# Patient Record
Sex: FEMALE | Race: WHITE | Employment: FULL TIME | URBAN - METROPOLITAN AREA
[De-identification: names, ages, dates, MRNs, and addresses within clinical notes are randomized per-mention and may not be internally consistent; named-entity substitution may affect disease eponyms.]

---

## 2017-04-20 ENCOUNTER — OFFICE VISIT (OUTPATIENT)
Dept: CARDIOLOGY CLINIC | Age: 62
End: 2017-04-20

## 2017-04-20 VITALS
HEIGHT: 64 IN | SYSTOLIC BLOOD PRESSURE: 181 MMHG | DIASTOLIC BLOOD PRESSURE: 95 MMHG | HEART RATE: 69 BPM | BODY MASS INDEX: 32.95 KG/M2 | WEIGHT: 193 LBS

## 2017-04-20 DIAGNOSIS — I47.1 PAROXYSMAL SUPRAVENTRICULAR TACHYCARDIA (HCC): Primary | ICD-10-CM

## 2017-04-20 DIAGNOSIS — I10 ESSENTIAL HYPERTENSION: ICD-10-CM

## 2017-04-20 DIAGNOSIS — G47.33 OSA (OBSTRUCTIVE SLEEP APNEA): ICD-10-CM

## 2017-04-20 DIAGNOSIS — E78.5 HYPERLIPIDEMIA, UNSPECIFIED HYPERLIPIDEMIA TYPE: ICD-10-CM

## 2017-04-20 RX ORDER — METOPROLOL SUCCINATE 100 MG/1
100 TABLET, EXTENDED RELEASE ORAL DAILY
Qty: 30 TAB | Refills: 6 | Status: SHIPPED | OUTPATIENT
Start: 2017-04-20 | End: 2017-11-30 | Stop reason: SDUPTHER

## 2017-04-20 NOTE — MR AVS SNAPSHOT
Visit Information Date & Time Provider Department Dept. Phone Encounter #  
 4/20/2017 10:30 AM Karon Huggins MD [facility] [address] [place] [address] [id number] Follow-up Instructions Return in about 6 months (around 10/20/2017). Your Appointments 4/20/2017 10:30 AM  
ESTABLISHED PATIENT with Karon Huggins MD  
[facility]) Appt Note: 6 months/ i told pt to come in at 8:45; 6 months [address], [address] [facility] [address]  
[address], [address] [facility]  
  
    
 10/5/2017  8:45 AM  
ESTABLISHED PATIENT with Karon Huggins MD  
[facility]) Appt Note: 6 months [address], [address] [facility] [address] [phone] Upcoming Health Maintenance Date Due Hepatitis C Screening 1955 DTaP/Tdap/Td series (1 - Tdap) 5/2/1975 FOBT Q 1 YEAR AGE 50-75 5/2/2004 BREAST CANCER SCRN MAMMOGRAM 11/23/2011 ZOSTER VACCINE AGE 60> 5/2/2014 PAP AKA CERVICAL CYTOLOGY 1/19/2015 INFLUENZA AGE 9 TO ADULT 8/1/2016 Allergies as of 4/20/2017  Review Complete On: 4/20/2017 By: Karon Huggins MD  
  
 Severity Noted Reaction Type Reactions Pcn [Penicillins] Medium 09/08/2015    Hives Current Immunizations  Never Reviewed No immunizations on file. Not reviewed this visit You Were Diagnosed With   
  
 Codes Comments Paroxysmal supraventricular tachycardia (HCC)    -  Primary ICD-10-CM: I47.1 ICD-9-CM: 427.0 recent increase palpitation Essential hypertension     ICD-10-CM: I10 
ICD-9-CM: 401.9 elevated 
adjust meds VALERY (obstructive sleep apnea)     ICD-10-CM: G47.33 
ICD-9-CM: 327.23 not able to get cpap Hyperlipidemia, unspecified hyperlipidemia type     ICD-10-CM: E78.5 ICD-9-CM: 272.4 on statin 
lab with pcp Vitals BP Pulse Height(growth percentile) Weight(growth percentile) BMI OB Status (!) 181/95 69 5' 4\" (1.626 m) 193 lb (87.5 kg) 33.13 kg/m2 Postmenopausal  
 Smoking Status Former Smoker BMI and BSA Data Body Mass Index Body Surface Area  
 33.13 kg/m 2 1.99 m 2 Preferred Pharmacy Pharmacy Name Phone [facility] [phone] Your Updated Medication List  
  
   
This list is accurate as of: 4/20/17  9:10 AM.  Always use your most recent med list. amLODIPine 5 mg tablet Commonly known as:  Esther Rafter Take 1 Tab by mouth daily. aspirin delayed-release 81 mg tablet Take  by mouth daily. buPROPion  mg SR tablet Commonly known as:  Sophia Mychal Take 150 mg by mouth daily. melatonin 3 mg tablet Take 3 mg by mouth nightly. metoprolol succinate 100 mg tablet Commonly known as:  TOPROL-XL Take 1 Tab by mouth daily. omeprazole 40 mg capsule Commonly known as:  PRILOSEC Take 40 mg by mouth daily. simvastatin 20 mg tablet Commonly known as:  ZOCOR Take  by mouth nightly. Prescriptions Sent to Pharmacy Refills  
 metoprolol succinate (TOPROL-XL) 100 mg tablet 6 Sig: Take 1 Tab by mouth daily. Class: Normal  
 Pharmacy: [address] [place] [address] #: [phone] Route: Oral  
  
Follow-up Instructions Return in about 6 months (around 10/20/2017). Introducing [facility]! [employer] introduces [employer] patient portal. Now you can access parts of your medical record, email your doctor's office, and request medication refills online. 1. In your internet browser, go to [email or web address] 2. Click on the First Time User? Click Here link in the Sign In box. You will see the New Member Sign Up page. 3. Enter your Choose Digital Access Code exactly as it appears below. You will not need to use this code after youve completed the sign-up process. If you do not sign up before the expiration date, you must request a new code. · [employer] Access Code: [email or web address] Expires: 7/19/2017  8:32 AM 
 
4. Enter the last four digits of your Social Security Number (xxxx) and Date of Birth (mm/dd/yyyy) as indicated and click Submit. You will be taken to the next sign-up page. 5. Create a Choose Digital ID. This will be your Choose Digital login ID and cannot be changed, so think of one that is secure and easy to remember. 6. Create a [employer] password. You can change your password at any time. 7. Enter your Password Reset Question and Answer. This can be used at a later time if you forget your password. 8. Enter your e-mail address. You will receive e-mail notification when new information is available in [address]. 9. Click Sign Up. You can now view and download portions of your medical record. 10. Click the Download Summary menu link to download a portable copy of your medical information. If you have questions, please visit the Frequently Asked Questions section of the [employer] website. Remember, [employer] is NOT to be used for urgent needs. For medical emergencies, dial 911. Now available from your iPhone and Android! Please provide this summary of care documentation to your next provider. Your primary care clinician is listed as Darvin Bill. 18.. If you have any questions after today's visit, please call [phone].

## 2017-04-20 NOTE — LETTER
Peewee Betancur 1955 4/20/2017 Dear Javed Castillo MD 
 
I had the pleasure of evaluating  Ms. Ramesh in office today. Below are the relevant portions of my assessment and plan of care. ICD-10-CM ICD-9-CM 1. Paroxysmal supraventricular tachycardia (HCC) I47.1 427.0   
 recent increase palpitation 2. Essential hypertension I10 401.9   
 elevated 
adjust meds 3. VALERY (obstructive sleep apnea) G47.33 327.23   
 not able to get cpap 4. Hyperlipidemia, unspecified hyperlipidemia type E78.5 272.4   
 on statin 
lab with pcp Current Outpatient Prescriptions Medication Sig Dispense Refill  metoprolol succinate (TOPROL-XL) 100 mg tablet Take 1 Tab by mouth daily. 30 Tab 6  
 amLODIPine (NORVASC) 5 mg tablet Take 1 Tab by mouth daily. 30 Tab 5  
 buPROPion SR (WELLBUTRIN SR) 150 mg SR tablet Take 150 mg by mouth daily.  omeprazole (PRILOSEC) 40 mg capsule Take 40 mg by mouth daily.  simvastatin (ZOCOR) 20 mg tablet Take  by mouth nightly.  melatonin 3 mg tablet Take 3 mg by mouth nightly.  aspirin delayed-release 81 mg tablet Take  by mouth daily. Orders Placed This Encounter  metoprolol succinate (TOPROL-XL) 100 mg tablet Sig: Take 1 Tab by mouth daily. Dispense:  30 Tab Refill:  6 If you have questions, please do not hesitate to call me. I look forward to following Ms. Ramesh along with you. Sincerely, Brent Osullivan MD

## 2017-04-20 NOTE — PROGRESS NOTES
1. Have you been to the ER, urgent care clinic since your last visit? Hospitalized since your last visit?      no    2. Have you seen or consulted any other health care providers outside of the [address] [place] [address] since your last visit? Include any pap smears or colon screening. Yes, pcp    3. Since your last visit, have you had any of the following symptoms? No cardiac symptoms    4. Have you had any blood work, X-rays or cardiac testing? Yes, [facility]    5. Where do you normally have your labs drawn? [facility]    6. Do you need any refills today?   no

## 2017-04-20 NOTE — PROGRESS NOTES
HISTORY OF PRESENT ILLNESS  Jenny Leone is a 57 y.o. female. HPI Comments: Patient with svt,htn,ghazala  . On follow up patient denies any chest pains,sob,  or other significant symptoms. Hypertension   The history is provided by the patient. This is a chronic problem. The problem occurs constantly. The problem has not changed since onset. Pertinent negatives include no chest pain, no abdominal pain, no headaches and no shortness of breath. Palpitations    The history is provided by the patient. This is a recurrent problem. The problem has been gradually worsening. The problem occurs every several days. The problem is associated with nothing. Pertinent negatives include no fever, no chest pain, no claudication, no orthopnea, no PND, no abdominal pain, no nausea, no vomiting, no headaches, no dizziness, no weakness, no cough, no hemoptysis, no shortness of breath and no sputum production. Her past medical history is significant for hypertension. SVT   The history is provided by the patient. This is a new problem. The current episode started more than 2 days ago. The problem occurs every several days. The problem has been resolved. Pertinent negatives include no chest pain, no abdominal pain, no headaches and no shortness of breath. Nothing aggravates the symptoms. Nothing relieves the symptoms. Review of Systems   Constitutional: Negative for chills and fever. HENT: Negative for nosebleeds. Eyes: Negative for blurred vision and double vision. Respiratory: Negative for cough, hemoptysis, sputum production, shortness of breath and wheezing. Cardiovascular: Positive for palpitations. Negative for chest pain, orthopnea, claudication, leg swelling and PND. Gastrointestinal: Negative for abdominal pain, heartburn, nausea and vomiting. Musculoskeletal: Negative for myalgias. Skin: Negative for rash. Neurological: Negative for dizziness, weakness and headaches.    Endo/Heme/Allergies: Does not bruise/bleed easily. Family History   Problem Relation Age of Onset    Heart Attack Neg Hx     Heart Surgery Neg Hx     Hypertension Mother    [employer] [address] [employer] Pac[employer] Father     Hypertension Maternal Grandmother        Past Medical History:   Diagnosis Date    Essential hypertension 9/10/2015    Hyperlipidemia 4/20/2017       No past surgical history on file. Social History   Substance Use Topics    Smoking status: Former Smoker     Quit date: 9/10/2008    Smokeless tobacco: Never Used    Alcohol use No       Allergies   Allergen Reactions    Pcn [Penicillins] Hives       Outpatient Prescriptions Marked as Taking for the 4/20/17 encounter (Office Visit) with Audra Oconnell MD   Medication Sig Dispense Refill    metoprolol succinate (TOPROL-XL) 100 mg tablet Take 1 Tab by mouth daily. 30 Tab 6    amLODIPine (NORVASC) 5 mg tablet Take 1 Tab by mouth daily. 30 Tab 5    buPROPion SR (WELLBUTRIN SR) 150 mg SR tablet Take 150 mg by mouth daily.  omeprazole (PRILOSEC) 40 mg capsule Take 40 mg by mouth daily.  simvastatin (ZOCOR) 20 mg tablet Take  by mouth nightly.  melatonin 3 mg tablet Take 3 mg by mouth nightly. Visit Vitals    BP (!) 181/95    Pulse 69    Ht 5' 4\" (1.626 m)    Wt 87.5 kg (193 lb)    BMI 33.13 kg/m2         Physical Exam   Constitutional: She is oriented to person, place, and time. She appears well-developed and well-nourished. HENT:   Head: Normocephalic and atraumatic. Eyes: Conjunctivae are normal.   Neck: Neck supple. No JVD present. No tracheal deviation present. No thyromegaly present. Cardiovascular: Normal rate and regular rhythm. PMI is not displaced. Exam reveals no gallop, no S3 and no decreased pulses. No murmur heard. Pulmonary/Chest: No respiratory distress. She has no wheezes. She has no rales. She exhibits no tenderness. Abdominal: Soft. There is no tenderness. Musculoskeletal: She exhibits no edema.    Neurological: She is alert and oriented to person, place, and time. Skin: Skin is warm. Psychiatric: She has a normal mood and affect. Ms. Frank Sutton has a reminder for a \"due or due soon\" health maintenance. I have asked that she contact her primary care provider for follow-up on this health maintenance. I have personally reviewed patient's records available from hospital and other providers and incorporated findings in patient care. Er-9/2015  I Have personally reviewed recent relevant labs available and discussed with patient  9/2015    I have personally reviewed patients ekg done at other facility. Patient had stress test done 1 year ago and reportedly normal at office in [address] [place] [address]  Left ventricle: Systolic function was normal by visual assessment. Ejection fraction was estimated to be 60 %. There were no regional wall  motion abnormalities. Atrial septum: There was no evidence of intracardiac shunt by  peripherally-administered agitated saline contrast.    Right atrium: The atrium was mildly dilated. Mitral valve: There was trivial regurgitation. Tricuspid valve: There was mild regurgitation. Tricuspid regurgitation  peak velocity: 2.3 m/sec. Pulmonary artery systolic pressure: 24 mmHg. I Have personally reviewed recent relevant labs available and discussed with patient  9/2016      Assessment         ICD-10-CM ICD-9-CM    1. Paroxysmal supraventricular tachycardia (HCC) I47.1 427.0     recent increase palpitation   2. Essential hypertension I10 401.9     elevated  adjust meds   3. VALERY (obstructive sleep apnea) G47.33 327.23     not able to get cpap   4.  Hyperlipidemia, unspecified hyperlipidemia type E78.5 272.4     on statin  lab with pcp       Medications Discontinued During This Encounter   Medication Reason    metoprolol succinate (TOPROL-XL) 50 mg XL tablet Reorder       Orders Placed This Encounter    metoprolol succinate (TOPROL-XL) 100 mg tablet     Sig: Take 1 Tab by mouth daily.     Dispense:  30 Tab     Refill:  6       Follow-up Disposition:  Return in about 6 months (around 10/20/2017).

## 2017-10-18 ENCOUNTER — OFFICE VISIT (OUTPATIENT)
Dept: CARDIOLOGY CLINIC | Age: 62
End: 2017-10-18

## 2017-10-18 VITALS
WEIGHT: 192 LBS | HEIGHT: 64 IN | SYSTOLIC BLOOD PRESSURE: 161 MMHG | DIASTOLIC BLOOD PRESSURE: 84 MMHG | BODY MASS INDEX: 32.78 KG/M2 | HEART RATE: 54 BPM

## 2017-10-18 DIAGNOSIS — E78.5 HYPERLIPIDEMIA, UNSPECIFIED HYPERLIPIDEMIA TYPE: ICD-10-CM

## 2017-10-18 DIAGNOSIS — G47.33 OSA (OBSTRUCTIVE SLEEP APNEA): ICD-10-CM

## 2017-10-18 DIAGNOSIS — I47.1 PAROXYSMAL SUPRAVENTRICULAR TACHYCARDIA (HCC): Primary | ICD-10-CM

## 2017-10-18 DIAGNOSIS — I10 ESSENTIAL HYPERTENSION: ICD-10-CM

## 2017-10-18 RX ORDER — CLOBETASOL PROPIONATE 0.05 MG/G
GEL TOPICAL 2 TIMES DAILY
COMMUNITY
End: 2018-11-21

## 2017-10-18 RX ORDER — METRONIDAZOLE 7.5 MG/G
GEL TOPICAL 2 TIMES DAILY
COMMUNITY
End: 2019-05-22

## 2017-10-18 NOTE — LETTER
Nimesh Harry 1955 
 
10/18/2017 Dear Bryan Glass MD 
 
I had the pleasure of evaluating  Ms. Ramesh in office today. Below are the relevant portions of my assessment and plan of care. ICD-10-CM ICD-9-CM 1. Paroxysmal supraventricular tachycardia (HCC) I47.1 427.0   
 stable 
rare palpitation 2. Essential hypertension I10 401.9   
 elevated 
normal at home 
does not have valery mask 3. Hyperlipidemia, unspecified hyperlipidemia type E78.5 272.4   
 stable 
on statin 4. VALERY (obstructive sleep apnea) G47.33 327.23   
 needs follow up Current Outpatient Prescriptions Medication Sig Dispense Refill  clobetasol (TEMOVATE) 0.05 % topical gel Apply  to affected area two (2) times a day.  metroNIDAZOLE (METROGEL) 0.75 % topical gel Apply  to affected area two (2) times a day.  metoprolol succinate (TOPROL-XL) 100 mg tablet Take 1 Tab by mouth daily. 30 Tab 6  
 amLODIPine (NORVASC) 5 mg tablet Take 1 Tab by mouth daily. 30 Tab 5  
 buPROPion SR (WELLBUTRIN SR) 150 mg SR tablet Take 150 mg by mouth daily.  omeprazole (PRILOSEC) 40 mg capsule Take 40 mg by mouth daily.  simvastatin (ZOCOR) 20 mg tablet Take  by mouth nightly. Orders Placed This Encounter  clobetasol (TEMOVATE) 0.05 % topical gel Sig: Apply  to affected area two (2) times a day.  metroNIDAZOLE (METROGEL) 0.75 % topical gel Sig: Apply  to affected area two (2) times a day. If you have questions, please do not hesitate to call me. I look forward to following Ms. Ramesh along with you. Sincerely, Brielle Yu MD

## 2017-10-18 NOTE — PROGRESS NOTES
HISTORY OF PRESENT ILLNESS  Leticia Petersen is a 60 y.o. female. HPI Comments: Patient with svt,htn,ghazala  . On follow up patient denies any chest pains,sob,  or other significant symptoms. Hypertension   The history is provided by the patient. This is a chronic problem. The problem occurs constantly. The problem has not changed since onset. Pertinent negatives include no chest pain, no abdominal pain, no headaches and no shortness of breath. Palpitations    The history is provided by the patient. This is a recurrent problem. The problem has been gradually worsening. The problem occurs every several days. The problem is associated with nothing. Pertinent negatives include no fever, no chest pain, no claudication, no orthopnea, no PND, no abdominal pain, no nausea, no vomiting, no headaches, no dizziness, no weakness, no cough, no hemoptysis, no shortness of breath and no sputum production. Her past medical history is significant for hypertension. SVT   The history is provided by the patient. This is a new problem. The current episode started more than 2 days ago. The problem occurs every several days. The problem has been resolved. Pertinent negatives include no chest pain, no abdominal pain, no headaches and no shortness of breath. Nothing aggravates the symptoms. Nothing relieves the symptoms. Review of Systems   Constitutional: Negative for chills and fever. HENT: Negative for nosebleeds. Eyes: Negative for blurred vision and double vision. Respiratory: Negative for cough, hemoptysis, sputum production, shortness of breath and wheezing. Cardiovascular: Positive for palpitations. Negative for chest pain, orthopnea, claudication, leg swelling and PND. Gastrointestinal: Negative for abdominal pain, heartburn, nausea and vomiting. Musculoskeletal: Negative for myalgias. Skin: Negative for rash. Neurological: Negative for dizziness, weakness and headaches.    Endo/Heme/Allergies: Does not bruise/bleed easily. Family History   Problem Relation Age of Onset    Heart Attack Neg Hx     Heart Surgery Neg Hx     Hypertension Mother    [employer] Pacemaker Father     Hypertension Maternal Grandmother        Past Medical History:   Diagnosis Date    Essential hypertension 9/10/2015    Hyperlipidemia 4/20/2017       No past surgical history on file. Social History   Substance Use Topics    Smoking status: Former Smoker     Quit date: 9/10/2008    Smokeless tobacco: Never Used    Alcohol use No       Allergies   Allergen Reactions    Pcn [Penicillins] Hives       Outpatient Prescriptions Marked as Taking for the 10/18/17 encounter (Office Visit) with Joy Rose MD   Medication Sig Dispense Refill    clobetasol (TEMOVATE) 0.05 % topical gel Apply  to affected area two (2) times a day.  metroNIDAZOLE (METROGEL) 0.75 % topical gel Apply  to affected area two (2) times a day.  metoprolol succinate (TOPROL-XL) 100 mg tablet Take 1 Tab by mouth daily. 30 Tab 6    amLODIPine (NORVASC) 5 mg tablet Take 1 Tab by mouth daily. 30 Tab 5    buPROPion SR (WELLBUTRIN SR) 150 mg SR tablet Take 150 mg by mouth daily.  omeprazole (PRILOSEC) 40 mg capsule Take 40 mg by mouth daily.  simvastatin (ZOCOR) 20 mg tablet Take  by mouth nightly. Visit Vitals    /84    Pulse (!) 54    Ht 5' 4\" (1.626 m)    Wt 87.1 kg (192 lb)    BMI 32.96 kg/m2         Physical Exam   Constitutional: She is oriented to person, place, and time. She appears well-developed and well-nourished. HENT:   Head: Normocephalic and atraumatic. Eyes: Conjunctivae are normal.   Neck: Neck supple. No JVD present. No tracheal deviation present. No thyromegaly present. Cardiovascular: Normal rate and regular rhythm. PMI is not displaced. Exam reveals no gallop, no S3 and no decreased pulses. No murmur heard. Pulmonary/Chest: No respiratory distress. She has no wheezes. She has no rales.  She exhibits no tenderness. Abdominal: Soft. There is no tenderness. Musculoskeletal: She exhibits no edema. Neurological: She is alert and oriented to person, place, and time. Skin: Skin is warm. Psychiatric: She has a normal mood and affect. Ms. Alexandre Fuentes has a reminder for a \"due or due soon\" health maintenance. I have asked that she contact her primary care provider for follow-up on this health maintenance. I have personally reviewed patient's records available from hospital and other providers and incorporated findings in patient care. Er-9/2015  I Have personally reviewed recent relevant labs available and discussed with patient  9/2015    I have personally reviewed patients ekg done at other facility. Patient had stress test done 1 year ago and reportedly normal at office in [address] [place] [address]  Left ventricle: Systolic function was normal by visual assessment. Ejection fraction was estimated to be 60 %. There were no regional wall  motion abnormalities. Atrial septum: There was no evidence of intracardiac shunt by  peripherally-administered agitated saline contrast.    Right atrium: The atrium was mildly dilated. Mitral valve: There was trivial regurgitation. Tricuspid valve: There was mild regurgitation. Tricuspid regurgitation  peak velocity: 2.3 m/sec. Pulmonary artery systolic pressure: 24 mmHg. I Have personally reviewed recent relevant labs available and discussed with patient  9/2016      Assessment         ICD-10-CM ICD-9-CM    1. Paroxysmal supraventricular tachycardia (HCC) I47.1 427.0     stable  rare palpitation   2. Essential hypertension I10 401.9     elevated  normal at home  does not have ghazala mask    3. Hyperlipidemia, unspecified hyperlipidemia type E78.5 272.4     stable  on statin   4.  GHAZALA (obstructive sleep apnea) G47.33 327.23     needs follow up   10/2017-does not want sleep apnea study at this time    Medications Discontinued During This Encounter Medication Reason    melatonin 3 mg tablet Not A Current Medication    aspirin delayed-release 81 mg tablet Not A Current Medication       No orders of the defined types were placed in this encounter. Follow-up Disposition:  Return in about 6 months (around 4/18/2018).

## 2017-10-18 NOTE — MR AVS SNAPSHOT
Visit Information Date & Time Provider Department Dept. Phone Encounter #  
 10/18/2017  9:15 AM Stoney Hamman, MD [facility] [address] [place] [address] [id number] Follow-up Instructions Return in about 6 months (around 4/18/2018). Upcoming Health Maintenance Date Due Hepatitis C Screening 1955 DTaP/Tdap/Td series (1 - Tdap) 5/2/1975 FOBT Q 1 YEAR AGE 50-75 5/2/2004 BREAST CANCER SCRN MAMMOGRAM 11/23/2011 ZOSTER VACCINE AGE 60> 3/2/2014 PAP AKA CERVICAL CYTOLOGY 1/19/2015 INFLUENZA AGE 9 TO ADULT 8/1/2017 Allergies as of 10/18/2017  Review Complete On: 10/18/2017 By: Stoney Hamman, MD  
  
 Severity Noted Reaction Type Reactions Pcn [Penicillins] Medium 09/08/2015    Hives Current Immunizations  Never Reviewed No immunizations on file. Not reviewed this visit You Were Diagnosed With   
  
 Codes Comments Paroxysmal supraventricular tachycardia (HCC)    -  Primary ICD-10-CM: I47.1 ICD-9-CM: 427.0 stable 
rare palpitation Essential hypertension     ICD-10-CM: I10 
ICD-9-CM: 401.9 elevated 
normal at home 
does not have valery mask Hyperlipidemia, unspecified hyperlipidemia type     ICD-10-CM: E78.5 ICD-9-CM: 272.4 stable 
on statin VALERY (obstructive sleep apnea)     ICD-10-CM: G47.33 
ICD-9-CM: 327.23 needs follow up Vitals BP Pulse Height(growth percentile) Weight(growth percentile) BMI OB Status 161/84 (!) 54 5' 4\" (1.626 m) 192 lb (87.1 kg) 32.96 kg/m2 Postmenopausal  
 Smoking Status Former Smoker Vitals History BMI and BSA Data Body Mass Index Body Surface Area  
 32.96 kg/m 2 1.98 m 2 Preferred Pharmacy Pharmacy Name Phone [facility] [address] [phone] Your Updated Medication List  
  
   
This list is accurate as of: 10/18/17  9:15 AM.  Always use your most recent med list.  
  
  
 amLODIPine 5 mg tablet Commonly known as:  Voncile Lansing Take 1 Tab by mouth daily. buPROPion  mg SR tablet Commonly known as:  Evlyn Sill Take 150 mg by mouth daily. clobetasol 0.05 % topical gel Commonly known as:  Linzie Rubins Apply  to affected area two (2) times a day. metoprolol succinate 100 mg tablet Commonly known as:  TOPROL-XL Take 1 Tab by mouth daily. metroNIDAZOLE 0.75 % topical gel Commonly known as:  Lesvia Delgado Apply  to affected area two (2) times a day. omeprazole 40 mg capsule Commonly known as:  PRILOSEC Take 40 mg by mouth daily. simvastatin 20 mg tablet Commonly known as:  ZOCOR Take  by mouth nightly. Follow-up Instructions Return in about 6 months (around 4/18/2018). Introducing [facility] & [facility]! Chasidy Santos introduces [employer]ess america patient portal. Now you can access parts of your medical record, email your doctor's office, and request medication refills online. 1. In your internet browser, go to [email or web address] 2. Click on the First Time User? Click Here link in the Sign In box. You will see the New Member Sign Up page. 3. Enter your aXess america Access Code exactly as it appears below. You will not need to use this code after youve completed the sign-up process. If you do not sign up before the expiration date, you must request a new code. · aXess america Access Code: [email or web address] Expires: 12/7/2017  3:21 PM 
 
4. Enter the last four digits of your Social Security Number (xxxx) and Date of Birth (mm/dd/yyyy) as indicated and click Submit. You will be taken to the next sign-up page. 5. Create a ConsortiEXt ID. This will be your aXess america login ID and cannot be changed, so think of one that is secure and easy to remember. 6. Create a aXess america password. You can change your password at any time. 7. Enter your Password Reset Question and Answer.  This can be used at a later time if you forget your password. 8. Enter your e-mail address. You will receive e-mail notification when new information is available in [address] 9. Click Sign Up. You can now view and download portions of your medical record. 10. Click the Download Summary menu link to download a portable copy of your medical information. If you have questions, please visit the Frequently Asked Questions section of the Kyber[employer] website. Remember, Kybernesis is NOT to be used for urgent needs. For medical emergencies, dial 911. Now available from your iPhone and Android! Please provide this summary of care documentation to your next provider. Your primary care clinician is listed as Cheri Merino. If you have any questions after today's visit, please call [phone].

## 2017-10-18 NOTE — PROGRESS NOTES
Patient brought medications list    1. Have you been to the ER, urgent care clinic since your last visit? Hospitalized since your last visit? No    2. Have you seen or consulted any other health care providers outside of the [address] [place] [address] since your last visit? Include any pap smears or colon screening.  Yes Where: PCP Routine GYN Routine

## 2017-11-30 RX ORDER — METOPROLOL SUCCINATE 100 MG/1
TABLET, EXTENDED RELEASE ORAL
Qty: 30 TAB | Refills: 6 | Status: SHIPPED | OUTPATIENT
Start: 2017-11-30 | End: 2018-06-02 | Stop reason: SDUPTHER

## 2018-04-19 ENCOUNTER — OFFICE VISIT (OUTPATIENT)
Dept: CARDIOLOGY CLINIC | Age: 63
End: 2018-04-19

## 2018-04-19 VITALS
WEIGHT: 194 LBS | HEART RATE: 56 BPM | SYSTOLIC BLOOD PRESSURE: 159 MMHG | BODY MASS INDEX: 33.12 KG/M2 | HEIGHT: 64 IN | DIASTOLIC BLOOD PRESSURE: 77 MMHG

## 2018-04-19 DIAGNOSIS — G47.33 OSA (OBSTRUCTIVE SLEEP APNEA): ICD-10-CM

## 2018-04-19 DIAGNOSIS — F32.A DEPRESSION, UNSPECIFIED DEPRESSION TYPE: ICD-10-CM

## 2018-04-19 DIAGNOSIS — E78.5 HYPERLIPIDEMIA, UNSPECIFIED HYPERLIPIDEMIA TYPE: ICD-10-CM

## 2018-04-19 DIAGNOSIS — I47.1 PAROXYSMAL SUPRAVENTRICULAR TACHYCARDIA (HCC): Primary | ICD-10-CM

## 2018-04-19 DIAGNOSIS — I10 ESSENTIAL HYPERTENSION: ICD-10-CM

## 2018-04-19 RX ORDER — LANOLIN ALCOHOL/MO/W.PET/CERES
CREAM (GRAM) TOPICAL
COMMUNITY

## 2018-04-19 RX ORDER — FLUOROURACIL 50 MG/G
CREAM TOPICAL 2 TIMES DAILY
COMMUNITY
End: 2019-05-22

## 2018-04-19 NOTE — PROGRESS NOTES
Patient brought medications list    1. Have you been to the ER, urgent care clinic since your last visit? Hospitalized since your last visit? No    2. Have you seen or consulted any other health care providers outside of the [address] [place] [address] since your last visit? Include any pap smears or colon screening.  Yes Where: Dermatology skin cancer

## 2018-04-19 NOTE — LETTER
Reynaldoklaudia Laughlin 1955 4/19/2018 Dear Rehan Tao MD 
 
I had the pleasure of evaluating  Ms. Ramesh in office today. Below are the relevant portions of my assessment and plan of care. ICD-10-CM ICD-9-CM 1. Paroxysmal supraventricular tachycardia (HCC) I47.1 427.0   
 stable 
rare palpittaion 2. Essential hypertension I10 401.9   
 elevated  
monitor at home 3. Hyperlipidemia, unspecified hyperlipidemia type E78.5 272.4   
 on statin 
lab with pcp 4. VALERY (obstructive sleep apnea) G47.33 327.23   
 does not use  cpap 5. Depression, unspecified depression type F32.9 311   
 on treatment 
managed by pcp Current Outpatient Prescriptions Medication Sig Dispense Refill  melatonin 3 mg tablet Take  by mouth.  emollient combination no.60 (SEBUDERM) gel by Apply Externally route.  fluorouracil (EFUDEX) 5 % chemo cream Apply  to affected area two (2) times a day.  metoprolol succinate (TOPROL-XL) 100 mg tablet take 1 tablet by mouth once daily 30 Tab 6  
 metroNIDAZOLE (METROGEL) 0.75 % topical gel Apply  to affected area two (2) times a day.  amLODIPine (NORVASC) 5 mg tablet Take 1 Tab by mouth daily. 30 Tab 5  
 buPROPion SR (WELLBUTRIN SR) 150 mg SR tablet Take 150 mg by mouth daily.  omeprazole (PRILOSEC) 40 mg capsule Take 40 mg by mouth daily.  simvastatin (ZOCOR) 20 mg tablet Take  by mouth nightly.  clobetasol (TEMOVATE) 0.05 % topical gel Apply  to affected area two (2) times a day. Orders Placed This Encounter  melatonin 3 mg tablet Sig: Take  by mouth.  emollient combination no.60 (SEBUDERM) gel Sig: by Apply Externally route.  fluorouracil (EFUDEX) 5 % chemo cream  
  Sig: Apply  to affected area two (2) times a day. If you have questions, please do not hesitate to call me. I look forward to following Ms. Ramesh along with you. Sincerely, Dee Dye MD

## 2018-04-19 NOTE — MR AVS SNAPSHOT
[address] [facility] [address] 
 
 
 [address] [facility], [address] [facility] [id number] [phone] Patient: Dillon Russo MRN: [id number] Visit Information Date & Time Provider Department Dept. Phone Encounter #  
 4/19/2018  9:00 AM Lisa Handy MD [facility] [address] [place] [address] [id number] Follow-up Instructions Return in about 6 months (around 10/19/2018) for bp chart at home. Your Appointments 10/24/2018  9:00 AM  
Office Visit with Lisa Handy MD  
[facility]) Appt Note: 6 months [address] [facility], [address] [facility] [address] [address] [facility], [address] [place] [address] Upcoming Health Maintenance Date Due Hepatitis C Screening 1955 DTaP/Tdap/Td series (1 - Tdap) 5/2/1975 FOBT Q 1 YEAR AGE 50-75 5/2/2004 BREAST CANCER SCRN MAMMOGRAM 11/23/2011 ZOSTER VACCINE AGE 60> 3/2/2014 PAP AKA CERVICAL CYTOLOGY 1/19/2015 Influenza Age 5 to Adult 8/1/2017 Allergies as of 4/19/2018  Review Complete On: 4/19/2018 By: Lisa Handy MD  
  
 Severity Noted Reaction Type Reactions Pcn [Penicillins] Medium 09/08/2015    Hives Current Immunizations  Never Reviewed No immunizations on file. Not reviewed this visit You Were Diagnosed With   
  
 Codes Comments Paroxysmal supraventricular tachycardia (HCC)    -  Primary ICD-10-CM: I47.1 ICD-9-CM: 427.0 stable 
rare palpittaion Essential hypertension     ICD-10-CM: I10 
ICD-9-CM: 401.9 elevated  
monitor at home Hyperlipidemia, unspecified hyperlipidemia type     ICD-10-CM: E78.5 ICD-9-CM: 272.4 on statin 
lab with pcp VALERY (obstructive sleep apnea)     ICD-10-CM: G47.33 
ICD-9-CM: 327.23 does not use  cpap Depression, unspecified depression type     ICD-10-CM: F32.9 ICD-9-CM: 311 on treatment 
managed by pcp [facility] BP Pulse Height(growth percentile) Weight(growth percentile) BMI OB Status 159/77 (!) 56 5' 4\" (1.626 m) 194 lb (88 kg) 33.3 kg/m2 Postmenopausal  
 Smoking Status Former Smoker Vitals History BMI and BSA Data Body Mass Index Body Surface Area  
 33.3 kg/m 2 1.99 m 2 Preferred Pharmacy Pharmacy Name Phone [facility] [address]. [phone] Your Updated Medication List  
  
   
This list is accurate as of 4/19/18  9:21 AM.  Always use your most recent med list. amLODIPine 5 mg tablet Commonly known as:  James Delgado Take 1 Tab by mouth daily. buPROPion  mg SR tablet Commonly known as:  Alley [place] Take 150 mg by mouth daily. clobetasol 0.05 % topical gel Commonly known as:  Jen Conception Apply  to affected area two (2) times a day. fluorouracil 5 % chemo cream  
Commonly known as:  EFUDEX Apply  to affected area two (2) times a day. melatonin 3 mg tablet Take  by mouth.  
  
 metoprolol succinate 100 mg tablet Commonly known as:  TOPROL-XL  
take 1 tablet by mouth once daily  
  
 metroNIDAZOLE 0.75 % topical gel Commonly known as:  Houston Grant Apply  to affected area two (2) times a day. omeprazole 40 mg capsule Commonly known as:  PRILOSEC Take 40 mg by mouth daily. SEBUDERM Gel Generic drug:  emollient combination no.60  
by Apply Externally route. simvastatin 20 mg tablet Commonly known as:  ZOCOR Take  by mouth nightly. Follow-up Instructions Return in about 6 months (around 10/19/2018) for bp chart at home. Introducing [facility]! Nereida Swan introduces Curiously patient portal. Now you can access parts of your medical record, email your doctor's office, and request medication refills online. 1. In your internet browser, go to [email or web address] 2. Click on the First Time User? Click Here link in the Sign In box. You will see the New Member Sign Up page. 3. Enter your Text[employer]¡do Access Code exactly as it appears below. You will not need to use this code after youve completed the sign-up process. If you do not sign up before the expiration date, you must request a new code. · Text[employer] Access Code: [email or web address] Expires: 7/18/2018  8:46 AM 
 
4. Enter the last four digits of your Social Security Number (xxxx) and Date of Birth (mm/dd/yyyy) as indicated and click Submit. You will be taken to the next sign-up page. 5. Create a TextÃ¡do ID. This will be your Text[employer]do login ID and cannot be changed, so think of one that is secure and easy to remember. 6. Create a TextÃ¡do password. You can change your password at any time. 7. Enter your Password Reset Question and Answer. This can be used at a later time if you forget your password. 8. Enter your e-mail address. You will receive e-mail notification when new information is available in [address] 9. Click Sign Up. You can now view and download portions of your medical record. 10. Click the Download Summary menu link to download a portable copy of your medical information. If you have questions, please visit the Frequently Asked Questions section of the [employer] website. Remember, Text[employer]do is NOT to be used for urgent needs. For medical emergencies, dial 911. Now available from your iPhone and Android! Please provide this summary of care documentation to your next provider. Your primary care clinician is listed as Enoch Yost. If you have any questions after today's visit, please call [phone].

## 2018-06-04 RX ORDER — METOPROLOL SUCCINATE 100 MG/1
TABLET, EXTENDED RELEASE ORAL
Qty: 30 TAB | Refills: 6 | Status: SHIPPED | OUTPATIENT
Start: 2018-06-04 | End: 2019-01-29 | Stop reason: SDUPTHER

## 2018-11-21 ENCOUNTER — OFFICE VISIT (OUTPATIENT)
Dept: CARDIOLOGY CLINIC | Age: 63
End: 2018-11-21

## 2018-11-21 VITALS
BODY MASS INDEX: 33.12 KG/M2 | HEART RATE: 67 BPM | SYSTOLIC BLOOD PRESSURE: 159 MMHG | DIASTOLIC BLOOD PRESSURE: 85 MMHG | HEIGHT: 64 IN | WEIGHT: 194 LBS

## 2018-11-21 DIAGNOSIS — I47.1 PAROXYSMAL SUPRAVENTRICULAR TACHYCARDIA (HCC): Primary | ICD-10-CM

## 2018-11-21 DIAGNOSIS — E78.5 HYPERLIPIDEMIA, UNSPECIFIED HYPERLIPIDEMIA TYPE: ICD-10-CM

## 2018-11-21 DIAGNOSIS — G47.33 OSA (OBSTRUCTIVE SLEEP APNEA): ICD-10-CM

## 2018-11-21 DIAGNOSIS — I10 ESSENTIAL HYPERTENSION: ICD-10-CM

## 2018-11-21 RX ORDER — AZITHROMYCIN 250 MG/1
250 TABLET, FILM COATED ORAL DAILY
COMMUNITY
End: 2019-05-22

## 2018-11-21 RX ORDER — BENZONATATE 200 MG/1
200 CAPSULE ORAL
COMMUNITY
End: 2019-05-22

## 2018-11-21 NOTE — PROGRESS NOTES
HISTORY OF PRESENT ILLNESS  Ellen Crawford is a 58 y.o. female. Patient with svt,htn,ghazala  . On follow up patient denies any chest pains,sob,  or other significant symptoms. Hypertension   The history is provided by the patient. This is a chronic problem. The problem occurs constantly. The problem has not changed since onset. Pertinent negatives include no chest pain, no abdominal pain, no headaches and no shortness of breath. Palpitations    The history is provided by the patient. This is a recurrent problem. The problem has been gradually worsening. The problem occurs every several days. The problem is associated with nothing. Pertinent negatives include no fever, no chest pain, no claudication, no orthopnea, no PND, no abdominal pain, no nausea, no vomiting, no headaches, no dizziness, no weakness, no cough, no hemoptysis, no shortness of breath and no sputum production. Her past medical history is significant for hypertension. SVT   The history is provided by the patient. This is a new problem. The current episode started more than 2 days ago. The problem occurs every several days. The problem has been resolved. Pertinent negatives include no chest pain, no abdominal pain, no headaches and no shortness of breath. Nothing aggravates the symptoms. Nothing relieves the symptoms. Review of Systems   Constitutional: Negative for chills and fever. HENT: Negative for nosebleeds. Eyes: Negative for blurred vision and double vision. Respiratory: Negative for cough, hemoptysis, sputum production, shortness of breath and wheezing. Cardiovascular: Positive for palpitations. Negative for chest pain, orthopnea, claudication, leg swelling and PND. Gastrointestinal: Negative for abdominal pain, heartburn, nausea and vomiting. Musculoskeletal: Negative for myalgias. Skin: Negative for rash. Neurological: Negative for dizziness, weakness and headaches.    Endo/Heme/Allergies: Does not bruise/bleed easily. Family History   Problem Relation Age of Onset    Heart Attack Neg Hx     Heart Surgery Neg Hx     Hypertension Mother    Salazar Pacemaker Father     Hypertension Maternal Grandmother        Past Medical History:   Diagnosis Date    Essential hypertension 9/10/2015    Hyperlipidemia 4/20/2017       History reviewed. No pertinent surgical history. Social History     Tobacco Use    Smoking status: Former Smoker     Last attempt to quit: 9/10/2008     Years since quitting: 10.2    Smokeless tobacco: Never Used   Substance Use Topics    Alcohol use: No       Allergies   Allergen Reactions    Pcn [Penicillins] Hives           Visit Vitals  /85   Pulse 67   Ht 5' 4\" (1.626 m)   Wt 88 kg (194 lb)   BMI 33.30 kg/m²         Physical Exam   Constitutional: She is oriented to person, place, and time. She appears well-developed and well-nourished. HENT:   Head: Normocephalic and atraumatic. Eyes: Conjunctivae are normal.   Neck: Neck supple. No JVD present. No tracheal deviation present. No thyromegaly present. Cardiovascular: Normal rate and regular rhythm. PMI is not displaced. Exam reveals no gallop, no S3 and no decreased pulses. No murmur heard. Pulmonary/Chest: No respiratory distress. She has no wheezes. She has no rales. She exhibits no tenderness. Abdominal: Soft. There is no tenderness. Musculoskeletal: She exhibits no edema. Neurological: She is alert and oriented to person, place, and time. Skin: Skin is warm. Psychiatric: She has a normal mood and affect. Ms. Perri Sepulveda has a reminder for a \"due or due soon\" health maintenance. I have asked that she contact her primary care provider for follow-up on this health maintenance. I have personally reviewed patient's records available from hospital and other providers and incorporated findings in patient care.   Er-9/2015  I Have personally reviewed recent relevant labs available and discussed with patient  9/2015    I have personally reviewed patients ekg done at other facility. Patient had stress test done 1 year ago and reportedly normal at office in [address] [place] [address]  Left ventricle: Systolic function was normal by visual assessment. Ejection fraction was estimated to be 60 %. There were no regional wall  motion abnormalities. Atrial septum: There was no evidence of intracardiac shunt by  peripherally-administered agitated saline contrast.    Right atrium: The atrium was mildly dilated. Mitral valve: There was trivial regurgitation. Tricuspid valve: There was mild regurgitation. Tricuspid regurgitation  peak velocity: 2.3 m/sec. Pulmonary artery systolic pressure: 24 mmHg. I Have personally reviewed recent relevant labs available and discussed with patient  9/2016      Assessment         ICD-10-CM ICD-9-CM    1. Paroxysmal supraventricular tachycardia (HCC) I47.1 427.0     stable  continue treatment  rare episode   2. Essential hypertension I10 401.9     Pressure elevated here usually normal   3. Hyperlipidemia, unspecified hyperlipidemia type E78.5 272.4     Stable on medication lab with PCP   4. VALERY (obstructive sleep apnea) G47.33 327.23     unable to use cpap   10/2017-does not want sleep apnea study at this time    Medications Discontinued During This Encounter   Medication Reason    clobetasol (TEMOVATE) 0.05 % topical gel Not A Current Medication       No orders of the defined types were placed in this encounter. Follow-up Disposition:  Return in about 6 months (around 5/21/2019).

## 2018-11-21 NOTE — LETTER
Feliciano Ritter 1955 11/21/2018 Dear Ang Penny MD 
 
I had the pleasure of evaluating  Ms. Ramesh in office today. Below are the relevant portions of my assessment and plan of care. ICD-10-CM ICD-9-CM 1. Paroxysmal supraventricular tachycardia (HCC) I47.1 427.0   
 stable 
continue treatment 
rare episode 2. Essential hypertension I10 401.9 Pressure elevated here usually normal  
3. Hyperlipidemia, unspecified hyperlipidemia type E78.5 272.4 Stable on medication lab with PCP 4. VALERY (obstructive sleep apnea) G47.33 327.23   
 unable to use cpap Current Outpatient Medications Medication Sig Dispense Refill  benzonatate (TESSALON) 200 mg capsule Take 200 mg by mouth three (3) times daily as needed for Cough.  azithromycin (ZITHROMAX Z-JAY JAY) 250 mg tablet Take 250 mg by mouth daily.  metoprolol succinate (TOPROL-XL) 100 mg tablet take 1 tablet by mouth once daily 30 Tab 6  
 melatonin 3 mg tablet Take  by mouth.  emollient combination no.60 (SEBUDERM) gel by Apply Externally route.  fluorouracil (EFUDEX) 5 % chemo cream Apply  to affected area two (2) times a day.  metroNIDAZOLE (METROGEL) 0.75 % topical gel Apply  to affected area two (2) times a day.  amLODIPine (NORVASC) 5 mg tablet Take 1 Tab by mouth daily. 30 Tab 5  
 buPROPion SR (WELLBUTRIN SR) 150 mg SR tablet Take 150 mg by mouth daily.  omeprazole (PRILOSEC) 40 mg capsule Take 40 mg by mouth daily.  simvastatin (ZOCOR) 20 mg tablet Take  by mouth nightly. Orders Placed This Encounter  benzonatate (TESSALON) 200 mg capsule Sig: Take 200 mg by mouth three (3) times daily as needed for Cough.  azithromycin (ZITHROMAX Z-JAY JAY) 250 mg tablet Sig: Take 250 mg by mouth daily. If you have questions, please do not hesitate to call me. I look forward to following Ms. Ramesh along with you. Sincerely, Soumya Calhoun MD

## 2018-11-21 NOTE — PROGRESS NOTES
Patient brought medications list    1. Have you been to the ER, urgent care clinic since your last visit? Hospitalized since your last visit? Yes When: 11/18 Where: [facility] Reason for visit: Sinus Infection    2. Have you seen or consulted any other health care providers outside of the [address] [place] [address] since your last visit? Include any pap smears or colon screening.  No

## 2019-01-29 RX ORDER — METOPROLOL SUCCINATE 100 MG/1
TABLET, EXTENDED RELEASE ORAL
Qty: 30 TAB | Refills: 6 | Status: SHIPPED | OUTPATIENT
Start: 2019-01-29 | End: 2019-07-24 | Stop reason: SDUPTHER

## 2019-05-22 ENCOUNTER — OFFICE VISIT (OUTPATIENT)
Dept: CARDIOLOGY CLINIC | Age: 64
End: 2019-05-22

## 2019-05-22 VITALS
HEART RATE: 57 BPM | HEIGHT: 64 IN | WEIGHT: 190 LBS | DIASTOLIC BLOOD PRESSURE: 86 MMHG | SYSTOLIC BLOOD PRESSURE: 159 MMHG | BODY MASS INDEX: 32.44 KG/M2

## 2019-05-22 DIAGNOSIS — I10 ESSENTIAL HYPERTENSION: ICD-10-CM

## 2019-05-22 DIAGNOSIS — G47.33 OSA (OBSTRUCTIVE SLEEP APNEA): ICD-10-CM

## 2019-05-22 DIAGNOSIS — I47.1 PAROXYSMAL SUPRAVENTRICULAR TACHYCARDIA (HCC): Primary | ICD-10-CM

## 2019-05-22 DIAGNOSIS — E78.5 HYPERLIPIDEMIA, UNSPECIFIED HYPERLIPIDEMIA TYPE: ICD-10-CM

## 2019-05-22 RX ORDER — GLUCOSAMINE SULFATE 1500 MG
POWDER IN PACKET (EA) ORAL DAILY
COMMUNITY

## 2019-05-22 NOTE — PROGRESS NOTES
1. Have you been to the ER, urgent care clinic since your last visit? Hospitalized since your last visit? No    2. Have you seen or consulted any other health care providers outside of the [address] [place] [address] since your last visit? Include any pap smears or colon screening.  No

## 2019-05-22 NOTE — PROGRESS NOTES
HISTORY OF PRESENT ILLNESS  Mariano Castellano is a 71 y.o. female. Patient with svt,htn,ghazala  . On follow up patient denies any chest pains,sob,  or other significant symptoms. Hypertension   The history is provided by the patient. This is a chronic problem. The problem occurs constantly. The problem has not changed since onset. Pertinent negatives include no chest pain, no abdominal pain, no headaches and no shortness of breath. Palpitations    The history is provided by the patient. This is a recurrent problem. The problem has been gradually worsening. The problem occurs every several days. The problem is associated with nothing. Pertinent negatives include no fever, no chest pain, no claudication, no orthopnea, no PND, no abdominal pain, no nausea, no vomiting, no headaches, no dizziness, no weakness, no cough, no hemoptysis, no shortness of breath and no sputum production. Her past medical history is significant for hypertension. SVT   The history is provided by the patient. This is a new problem. The current episode started more than 2 days ago. The problem occurs every several days. The problem has been resolved. Pertinent negatives include no chest pain, no abdominal pain, no headaches and no shortness of breath. Nothing aggravates the symptoms. Nothing relieves the symptoms. Review of Systems   Constitutional: Negative for chills and fever. HENT: Negative for nosebleeds. Eyes: Negative for blurred vision and double vision. Respiratory: Negative for cough, hemoptysis, sputum production, shortness of breath and wheezing. Cardiovascular: Positive for palpitations. Negative for chest pain, orthopnea, claudication, leg swelling and PND. Gastrointestinal: Negative for abdominal pain, heartburn, nausea and vomiting. Musculoskeletal: Negative for myalgias. Skin: Negative for rash. Neurological: Negative for dizziness, weakness and headaches.    Endo/Heme/Allergies: Does not bruise/bleed easily. Family History   Problem Relation Age of Onset    Heart Attack Neg Hx     Heart Surgery Neg Hx     Hypertension Mother    Tyrone iMller Pacemaker Father     Hypertension Maternal Grandmother        Past Medical History:   Diagnosis Date    Essential hypertension 9/10/2015    Hyperlipidemia 4/20/2017       History reviewed. No pertinent surgical history. Social History     Tobacco Use    Smoking status: Former Smoker     Last attempt to quit: 9/10/2008     Years since quitting: 10.7    Smokeless tobacco: Never Used   Substance Use Topics    Alcohol use: No       Allergies   Allergen Reactions    Pcn [Penicillins] Hives           Visit Vitals  /86   Pulse (!) 57   Ht 5' 4\" (1.626 m)   Wt 86.2 kg (190 lb)   BMI 32.61 kg/m²         Physical Exam   Constitutional: She is oriented to person, place, and time. She appears well-developed and well-nourished. HENT:   Head: Normocephalic and atraumatic. Eyes: Conjunctivae are normal.   Neck: Neck supple. No JVD present. No tracheal deviation present. No thyromegaly present. Cardiovascular: Normal rate and regular rhythm. PMI is not displaced. Exam reveals no gallop, no S3 and no decreased pulses. No murmur heard. Pulmonary/Chest: No respiratory distress. She has no wheezes. She has no rales. She exhibits no tenderness. Abdominal: Soft. There is no tenderness. Musculoskeletal: She exhibits no edema. Neurological: She is alert and oriented to person, place, and time. Skin: Skin is warm. Psychiatric: She has a normal mood and affect. Ms. Kait Go has a reminder for a \"due or due soon\" health maintenance. I have asked that she contact her primary care provider for follow-up on this health maintenance. I have personally reviewed patient's records available from hospital and other providers and incorporated findings in patient care.   Er-9/2015  I Have personally reviewed recent relevant labs available and discussed with patient  9/2015    I have personally reviewed patients ekg done at other facility. Patient had stress test done 1 year ago and reportedly normal at office in [address] [place] [address]  Left ventricle: Systolic function was normal by visual assessment. Ejection fraction was estimated to be 60 %. There were no regional wall  motion abnormalities. Atrial septum: There was no evidence of intracardiac shunt by  peripherally-administered agitated saline contrast.    Right atrium: The atrium was mildly dilated. Mitral valve: There was trivial regurgitation. Tricuspid valve: There was mild regurgitation. Tricuspid regurgitation  peak velocity: 2.3 m/sec. Pulmonary artery systolic pressure: 24 mmHg. I Have personally reviewed recent relevant labs available and discussed with patient  9/2016      Assessment         ICD-10-CM ICD-9-CM    1. Paroxysmal supraventricular tachycardia (HCC) I47.1 427.0     Stable occasional palpitation continue current treatment on beta-blocker   2. Essential hypertension I10 401.9     Elevated blood pressure in office. Continue home monitoring and decide on medication adjustment. Salt restriction   3. Hyperlipidemia, unspecified hyperlipidemia type E78.5 272.4     Continue treatment. Lab with PCP   4. VALERY (obstructive sleep apnea) G47.33 327.23     Does not use CPAP. Continue diet and exercise   10/2017-does not want sleep apnea study at this time    Medications Discontinued During This Encounter   Medication Reason    benzonatate (TESSALON) 200 mg capsule Not A Current Medication    azithromycin (ZITHROMAX Z-JAY JAY) 250 mg tablet Not A Current Medication    emollient combination no.60 (SEBUDERM) gel Not A Current Medication    fluorouracil (EFUDEX) 5 % chemo cream Not A Current Medication    metroNIDAZOLE (METROGEL) 0.75 % topical gel Not A Current Medication       No orders of the defined types were placed in this encounter.       Follow-up and Dispositions · Return in about 6 months (around 11/22/2019).

## 2019-07-24 RX ORDER — METOPROLOL SUCCINATE 100 MG/1
TABLET, EXTENDED RELEASE ORAL
Qty: 30 TAB | Refills: 3 | Status: SHIPPED | OUTPATIENT
Start: 2019-07-24 | End: 2019-11-23 | Stop reason: SDUPTHER

## 2019-11-06 ENCOUNTER — OFFICE VISIT (OUTPATIENT)
Dept: CARDIOLOGY CLINIC | Age: 64
End: 2019-11-06

## 2019-11-06 VITALS
DIASTOLIC BLOOD PRESSURE: 82 MMHG | BODY MASS INDEX: 32.78 KG/M2 | HEIGHT: 64 IN | SYSTOLIC BLOOD PRESSURE: 168 MMHG | HEART RATE: 60 BPM | WEIGHT: 192 LBS

## 2019-11-06 DIAGNOSIS — E78.5 HYPERLIPIDEMIA, UNSPECIFIED HYPERLIPIDEMIA TYPE: ICD-10-CM

## 2019-11-06 DIAGNOSIS — I47.1 PAROXYSMAL SUPRAVENTRICULAR TACHYCARDIA (HCC): Primary | ICD-10-CM

## 2019-11-06 DIAGNOSIS — G47.33 OSA (OBSTRUCTIVE SLEEP APNEA): ICD-10-CM

## 2019-11-06 DIAGNOSIS — I10 ESSENTIAL HYPERTENSION: ICD-10-CM

## 2019-11-06 RX ORDER — ATORVASTATIN CALCIUM 20 MG/1
20 TABLET, FILM COATED ORAL DAILY
Qty: 90 TAB | Refills: 3 | Status: SHIPPED | OUTPATIENT
Start: 2019-11-06 | End: 2020-09-16 | Stop reason: SDUPTHER

## 2019-11-06 RX ORDER — CITALOPRAM 30 MG/1
30 CAPSULE ORAL DAILY
COMMUNITY

## 2019-11-06 RX ORDER — AMLODIPINE BESYLATE 10 MG/1
10 TABLET ORAL DAILY
Qty: 90 TAB | Refills: 3 | Status: SHIPPED | OUTPATIENT
Start: 2019-11-06 | End: 2020-09-16 | Stop reason: SDUPTHER

## 2019-11-06 NOTE — PROGRESS NOTES
1. Have you been to the ER, urgent care clinic since your last visit? Hospitalized since your last visit? No    2. Have you seen or consulted any other health care providers outside of the [address] [place] [address] since your last visit? Include any pap smears or colon screening.  Yes Where: [facility] Reason for visit: Cataract Surgery

## 2019-11-06 NOTE — PROGRESS NOTES
HISTORY OF PRESENT ILLNESS  Nabil Carreon is a 71 y.o. female. Patient with svt,htn,ghazala  On follow up patient denies any chest pains,sob,  or other significant symptoms. Hypertension   The history is provided by the patient. This is a chronic problem. The problem occurs constantly. The problem has not changed since onset. Pertinent negatives include no chest pain, no abdominal pain, no headaches and no shortness of breath. Palpitations    The history is provided by the patient. This is a recurrent problem. The problem has been gradually worsening. The problem occurs every several days. The problem is associated with nothing. Pertinent negatives include no fever, no chest pain, no claudication, no orthopnea, no PND, no abdominal pain, no nausea, no vomiting, no headaches, no dizziness, no weakness, no cough, no hemoptysis, no shortness of breath and no sputum production. Her past medical history is significant for hypertension. SVT   The history is provided by the patient. This is a new problem. The current episode started more than 2 days ago. The problem occurs every several days. The problem has been resolved. Pertinent negatives include no chest pain, no abdominal pain, no headaches and no shortness of breath. Nothing aggravates the symptoms. Nothing relieves the symptoms. Review of Systems   Constitutional: Negative for chills and fever. HENT: Negative for nosebleeds. Eyes: Negative for blurred vision and double vision. Respiratory: Negative for cough, hemoptysis, sputum production, shortness of breath and wheezing. Cardiovascular: Positive for palpitations. Negative for chest pain, orthopnea, claudication, leg swelling and PND. Gastrointestinal: Negative for abdominal pain, heartburn, nausea and vomiting. Musculoskeletal: Negative for myalgias. Skin: Negative for rash. Neurological: Negative for dizziness, weakness and headaches.    Endo/Heme/Allergies: Does not bruise/bleed easily. Family History   Problem Relation Age of Onset    Heart Attack Neg Hx     Heart Surgery Neg Hx     Hypertension Mother    Mackenzie Parker Pacemaker Father     Hypertension Maternal Grandmother        Past Medical History:   Diagnosis Date    Essential hypertension 9/10/2015    Hyperlipidemia 2017       History reviewed. No pertinent surgical history. Social History     Tobacco Use    Smoking status: Former Smoker     Last attempt to quit: 9/10/2008     Years since quittin.1    Smokeless tobacco: Never Used   Substance Use Topics    Alcohol use: No       Allergies   Allergen Reactions    Pcn [Penicillins] Hives           Visit Vitals  /82   Pulse 60   Ht 5' 4\" (1.626 m)   Wt 87.1 kg (192 lb)   BMI 32.96 kg/m²         Physical Exam   Constitutional: She is oriented to person, place, and time. She appears well-developed and well-nourished. HENT:   Head: Normocephalic and atraumatic. Eyes: Conjunctivae are normal.   Neck: Neck supple. No JVD present. No tracheal deviation present. No thyromegaly present. Cardiovascular: Normal rate and regular rhythm. PMI is not displaced. Exam reveals no gallop, no S3 and no decreased pulses. No murmur heard. Pulmonary/Chest: No respiratory distress. She has no wheezes. She has no rales. She exhibits no tenderness. Abdominal: Soft. There is no tenderness. Musculoskeletal: She exhibits no edema. Neurological: She is alert and oriented to person, place, and time. Skin: Skin is warm. Psychiatric: She has a normal mood and affect. Ms. Brad Hollingsworth has a reminder for a \"due or due soon\" health maintenance. I have asked that she contact her primary care provider for follow-up on this health maintenance. I have personally reviewed patient's records available from hospital and other providers and incorporated findings in patient care.   Er-2015  I Have personally reviewed recent relevant labs available and discussed with patient  2015    I have personally reviewed patients ekg done at other facility. Patient had stress test done 1 year ago and reportedly normal at office in [address] [place] [address]  Left ventricle: Systolic function was normal by visual assessment. Ejection fraction was estimated to be 60 %. There were no regional wall  motion abnormalities. Atrial septum: There was no evidence of intracardiac shunt by  peripherally-administered agitated saline contrast.    Right atrium: The atrium was mildly dilated. Mitral valve: There was trivial regurgitation. Tricuspid valve: There was mild regurgitation. Tricuspid regurgitation  peak velocity: 2.3 m/sec. Pulmonary artery systolic pressure: 24 mmHg. I Have personally reviewed recent relevant labs available and discussed with patient  9/2016      Assessment         ICD-10-CM ICD-9-CM    1. Paroxysmal supraventricular tachycardia (HCC) I47.1 427.0 HEPATIC FUNCTION PANEL      LIPID PANEL    Stable continue current medical management   2. Essential hypertension I10 401.9     Elevated blood pressure. I will increase amlodipine to 10 mg a day   3. Hyperlipidemia, unspecified hyperlipidemia type E78.5 272.4 HEPATIC FUNCTION PANEL      LIPID PANEL    I have change simvastatin to atorvastatin as we have increase Norvasc to 10 mg   4. VALERY (obstructive sleep apnea) G47.33 327.23     Continue treatment.      10/2017-does not want sleep apnea study at this time    Medications Discontinued During This Encounter   Medication Reason    buPROPion SR (WELLBUTRIN SR) 150 mg SR tablet Discontinued by Another Clinician    simvastatin (ZOCOR) 20 mg tablet Alternate Therapy    amLODIPine (NORVASC) 5 mg tablet        Orders Placed This Encounter    HEPATIC FUNCTION PANEL     Standing Status:   Future     Standing Expiration Date:   5/6/2020    LIPID PANEL     Standing Status:   Future     Standing Expiration Date:   5/6/2020    amLODIPine (NORVASC) 10 mg tablet     Sig: Take 1 Tab by mouth daily. Dispense:  90 Tab     Refill:  3    atorvastatin (LIPITOR) 20 mg tablet     Sig: Take 1 Tab by mouth daily. Dispense:  90 Tab     Refill:  3       Follow-up and Dispositions    · Return in about 6 months (around 5/6/2020).

## 2019-11-25 RX ORDER — METOPROLOL SUCCINATE 100 MG/1
TABLET, EXTENDED RELEASE ORAL
Qty: 30 TAB | Refills: 3 | Status: SHIPPED | OUTPATIENT
Start: 2019-11-25 | End: 2020-03-19

## 2020-03-11 ENCOUNTER — OFFICE VISIT (OUTPATIENT)
Dept: CARDIOLOGY CLINIC | Age: 65
End: 2020-03-11

## 2020-03-11 VITALS
BODY MASS INDEX: 33.63 KG/M2 | HEART RATE: 52 BPM | DIASTOLIC BLOOD PRESSURE: 74 MMHG | OXYGEN SATURATION: 96 % | WEIGHT: 197 LBS | HEIGHT: 64 IN | SYSTOLIC BLOOD PRESSURE: 142 MMHG

## 2020-03-11 DIAGNOSIS — I10 ESSENTIAL HYPERTENSION: ICD-10-CM

## 2020-03-11 DIAGNOSIS — I47.1 PAROXYSMAL SUPRAVENTRICULAR TACHYCARDIA (HCC): Primary | ICD-10-CM

## 2020-03-11 DIAGNOSIS — E78.5 HYPERLIPIDEMIA, UNSPECIFIED HYPERLIPIDEMIA TYPE: ICD-10-CM

## 2020-03-11 DIAGNOSIS — G47.33 OSA (OBSTRUCTIVE SLEEP APNEA): ICD-10-CM

## 2020-03-11 RX ORDER — TRIAMCINOLONE ACETONIDE 1 MG/G
CREAM TOPICAL DAILY
COMMUNITY

## 2020-03-11 NOTE — PROGRESS NOTES
HISTORY OF PRESENT ILLNESS  Scooby Shi is a 71 y.o. female. Patient with svt,htn,ghazala  On follow up patient denies any chest pains,sob,  or other significant symptoms. Hypertension   The history is provided by the patient. This is a chronic problem. The problem occurs constantly. The problem has not changed since onset. Pertinent negatives include no chest pain, no abdominal pain, no headaches and no shortness of breath. Palpitations    The history is provided by the patient. This is a recurrent problem. The problem has been gradually worsening. The problem occurs every several days. The problem is associated with nothing. Pertinent negatives include no fever, no chest pain, no claudication, no orthopnea, no PND, no abdominal pain, no nausea, no vomiting, no headaches, no dizziness, no weakness, no cough, no hemoptysis, no shortness of breath and no sputum production. Her past medical history is significant for hypertension. SVT   The history is provided by the patient. This is a new problem. The current episode started more than 2 days ago. The problem occurs every several days. The problem has been resolved. Pertinent negatives include no chest pain, no abdominal pain, no headaches and no shortness of breath. Nothing aggravates the symptoms. Nothing relieves the symptoms. Review of Systems   Constitutional: Negative for chills and fever. HENT: Negative for nosebleeds. Eyes: Negative for blurred vision and double vision. Respiratory: Negative for cough, hemoptysis, sputum production, shortness of breath and wheezing. Cardiovascular: Positive for palpitations. Negative for chest pain, orthopnea, claudication, leg swelling and PND. Gastrointestinal: Negative for abdominal pain, heartburn, nausea and vomiting. Musculoskeletal: Negative for myalgias. Skin: Negative for rash. Neurological: Negative for dizziness, weakness and headaches.    Endo/Heme/Allergies: Does not bruise/bleed easily. Family History   Problem Relation Age of Onset    Heart Attack Neg Hx     Heart Surgery Neg Hx     Hypertension Mother    Meena Henry Pacemaker Father     Hypertension Maternal Grandmother        Past Medical History:   Diagnosis Date    Essential hypertension 9/10/2015    Hyperlipidemia 2017       No past surgical history on file. Social History     Tobacco Use    Smoking status: Former Smoker     Last attempt to quit: 9/10/2008     Years since quittin.5    Smokeless tobacco: Never Used   Substance Use Topics    Alcohol use: No       Allergies   Allergen Reactions    Pcn [Penicillins] Hives           Visit Vitals  Ht 5' 4\" (1.626 m)   Wt 89.4 kg (197 lb)   BMI 33.81 kg/m²         Physical Exam   Constitutional: She is oriented to person, place, and time. She appears well-developed and well-nourished. HENT:   Head: Normocephalic and atraumatic. Eyes: Conjunctivae are normal.   Neck: Neck supple. No JVD present. No tracheal deviation present. No thyromegaly present. Cardiovascular: Normal rate and regular rhythm. PMI is not displaced. Exam reveals no gallop, no S3 and no decreased pulses. No murmur heard. Pulmonary/Chest: No respiratory distress. She has no wheezes. She has no rales. She exhibits no tenderness. Abdominal: Soft. There is no abdominal tenderness. Musculoskeletal:         General: No edema. Neurological: She is alert and oriented to person, place, and time. Skin: Skin is warm. Psychiatric: She has a normal mood and affect. Ms. Marin Conte has a reminder for a \"due or due soon\" health maintenance. I have asked that she contact her primary care provider for follow-up on this health maintenance. I have personally reviewed patient's records available from hospital and other providers and incorporated findings in patient care.   Er-2015  I Have personally reviewed recent relevant labs available and discussed with patient  2015    I have personally reviewed patients ekg done at other facility. Patient had stress test done 1 year ago and reportedly normal at office in [address] [place] [address]  Left ventricle: Systolic function was normal by visual assessment. Ejection fraction was estimated to be 60 %. There were no regional wall  motion abnormalities. Atrial septum: There was no evidence of intracardiac shunt by  peripherally-administered agitated saline contrast.    Right atrium: The atrium was mildly dilated. Mitral valve: There was trivial regurgitation. Tricuspid valve: There was mild regurgitation. Tricuspid regurgitation  peak velocity: 2.3 m/sec. Pulmonary artery systolic pressure: 24 mmHg. I Have personally reviewed recent relevant labs available and discussed with patient  9/2016      Assessment         ICD-10-CM ICD-9-CM    1. Paroxysmal supraventricular tachycardia (HCC) I47.1 427.0     Stable continue current medical management   2. Essential hypertension I10 401.9     Stable on treatment monitor   3. Hyperlipidemia, unspecified hyperlipidemia type E78.5 272.4     Continue treatment recent lab reviewed   4. VALERY (obstructive sleep apnea) G47.33 327.23     Stable monitor   10/2017-does not want sleep apnea study at this time    There are no discontinued medications. No orders of the defined types were placed in this encounter.

## 2020-03-11 NOTE — PROGRESS NOTES
1. Have you been to the ER, urgent care clinic since your last visit? Hospitalized since your last visit? No    2. Have you seen or consulted any other health care providers outside of the [address] [place] [address] since your last visit? Include any pap smears or colon screening.  Yes When: 10/2019 Where: [facility] Reason for visit: Cataracts Removed

## 2020-03-19 RX ORDER — METOPROLOL SUCCINATE 100 MG/1
TABLET, EXTENDED RELEASE ORAL
Qty: 30 TAB | Refills: 3 | Status: SHIPPED | OUTPATIENT
Start: 2020-03-19 | End: 2020-06-11

## 2020-06-11 RX ORDER — METOPROLOL SUCCINATE 100 MG/1
TABLET, EXTENDED RELEASE ORAL
Qty: 90 TAB | Refills: 1 | Status: SHIPPED | OUTPATIENT
Start: 2020-06-11 | End: 2020-09-09 | Stop reason: SDUPTHER

## 2020-09-09 ENCOUNTER — OFFICE VISIT (OUTPATIENT)
Dept: CARDIOLOGY CLINIC | Age: 65
End: 2020-09-09

## 2020-09-09 VITALS
HEIGHT: 64 IN | DIASTOLIC BLOOD PRESSURE: 67 MMHG | BODY MASS INDEX: 31.76 KG/M2 | SYSTOLIC BLOOD PRESSURE: 128 MMHG | HEART RATE: 52 BPM | OXYGEN SATURATION: 94 % | TEMPERATURE: 97.7 F | WEIGHT: 186 LBS

## 2020-09-09 DIAGNOSIS — E78.5 HYPERLIPIDEMIA, UNSPECIFIED HYPERLIPIDEMIA TYPE: ICD-10-CM

## 2020-09-09 DIAGNOSIS — G47.33 OSA (OBSTRUCTIVE SLEEP APNEA): ICD-10-CM

## 2020-09-09 DIAGNOSIS — I10 ESSENTIAL HYPERTENSION: ICD-10-CM

## 2020-09-09 DIAGNOSIS — I47.1 PAROXYSMAL SUPRAVENTRICULAR TACHYCARDIA (HCC): Primary | ICD-10-CM

## 2020-09-09 RX ORDER — METOPROLOL SUCCINATE 100 MG/1
TABLET, EXTENDED RELEASE ORAL
Qty: 90 TAB | Refills: 1 | Status: SHIPPED | OUTPATIENT
Start: 2020-09-09 | End: 2020-12-17 | Stop reason: SDUPTHER

## 2020-09-09 NOTE — PROGRESS NOTES
1. Have you been to the ER, urgent care clinic since your last visit? Hospitalized since your last visit? No    2. Have you seen or consulted any other health care providers outside of the [address] [place] [address] since your last visit? Include any pap smears or colon screening.  Yes Where: [facility] Reason for visit: Routine Visit

## 2020-09-09 NOTE — PROGRESS NOTES
HISTORY OF PRESENT ILLNESS  Lauren Goodman is a 76 y.o. female. Patient with svt,htn,ghazala  On follow up patient denies any chest pains,sob,  or other significant symptoms. Hypertension   The history is provided by the patient. This is a chronic problem. The problem occurs constantly. The problem has not changed since onset. Pertinent negatives include no chest pain, no abdominal pain, no headaches and no shortness of breath. Palpitations    The history is provided by the patient. This is a recurrent problem. The problem has been gradually worsening. The problem occurs every several days. The problem is associated with nothing. Pertinent negatives include no fever, no chest pain, no claudication, no orthopnea, no PND, no abdominal pain, no nausea, no vomiting, no headaches, no dizziness, no weakness, no cough, no hemoptysis, no shortness of breath and no sputum production. Her past medical history is significant for hypertension. SVT   The history is provided by the patient. This is a new problem. The current episode started more than 2 days ago. The problem occurs every several days. The problem has been resolved. Pertinent negatives include no chest pain, no abdominal pain, no headaches and no shortness of breath. Nothing aggravates the symptoms. Nothing relieves the symptoms. Review of Systems   Constitutional: Negative for chills and fever. HENT: Negative for nosebleeds. Eyes: Negative for blurred vision and double vision. Respiratory: Negative for cough, hemoptysis, sputum production, shortness of breath and wheezing. Cardiovascular: Positive for palpitations. Negative for chest pain, orthopnea, claudication, leg swelling and PND. Gastrointestinal: Negative for abdominal pain, heartburn, nausea and vomiting. Musculoskeletal: Negative for myalgias. Skin: Negative for rash. Neurological: Negative for dizziness, weakness and headaches.    Endo/Heme/Allergies: Does not bruise/bleed easily. Family History   Problem Relation Age of Onset    Heart Attack Neg Hx     Heart Surgery Neg Hx     Hypertension Mother    [employer] [facility] [employer] Pac[employer] Father     Hypertension Maternal Grandmother        Past Medical History:   Diagnosis Date    Essential hypertension 9/10/2015    Hyperlipidemia 2017       No past surgical history on file. Social History     Tobacco Use    Smoking status: Former Smoker     Last attempt to quit: 9/10/2008     Years since quittin.0    Smokeless tobacco: Never Used   Substance Use Topics    Alcohol use: No       Allergies   Allergen Reactions    Pcn [Penicillins] Hives           Visit Vitals  /67 (BP 1 Location: Left arm, BP Patient Position: Sitting)   Pulse (!) 52   Temp 97.7 °F (36.5 °C) (Temporal)   Ht 5' 4\" (1.626 m)   Wt 84.4 kg (186 lb)   SpO2 94%   BMI 31.93 kg/m²         Physical Exam   Constitutional: She is oriented to person, place, and time. She appears well-developed and well-nourished. HENT:   Head: Normocephalic and atraumatic. Eyes: Conjunctivae are normal.   Neck: Neck supple. No JVD present. No tracheal deviation present. No thyromegaly present. Cardiovascular: Normal rate and regular rhythm. PMI is not displaced. Exam reveals no gallop, no S3 and no decreased pulses. No murmur heard. Pulmonary/Chest: No respiratory distress. She has no wheezes. She has no rales. She exhibits no tenderness. Abdominal: Soft. There is no abdominal tenderness. Musculoskeletal:         General: No edema. Neurological: She is alert and oriented to person, place, and time. Skin: Skin is warm. Psychiatric: She has a normal mood and affect. Ms. Lucian Keene has a reminder for a \"due or due soon\" health maintenance. I have asked that she contact her primary care provider for follow-up on this health maintenance.     I have personally reviewed patient's records available from hospital and other providers and incorporated findings in patient care.  Er-9/2015  I Have personally reviewed recent relevant labs available and discussed with patient  9/2015    I have personally reviewed patients ekg done at other facility. Patient had stress test done 1 year ago and reportedly normal at office in [address] [place] [address]  Left ventricle: Systolic function was normal by visual assessment. Ejection fraction was estimated to be 60 %. There were no regional wall  motion abnormalities. Atrial septum: There was no evidence of intracardiac shunt by  peripherally-administered agitated saline contrast.    Right atrium: The atrium was mildly dilated. Mitral valve: There was trivial regurgitation. Tricuspid valve: There was mild regurgitation. Tricuspid regurgitation  peak velocity: 2.3 m/sec. Pulmonary artery systolic pressure: 24 mmHg. I Have personally reviewed recent relevant labs available and discussed with patient  9/2016    Lipid Complete PanelResulted: 12/3/2019 5:31 PM  [address]  Component Name Value Ref Range   Cholesterol 169 110 - 200 mg/dL   Triglyceride 119 40 - 149 mg/dL   HDL 64 >=40 mg/dL   Cholesterol/HDL 2.6 0.0 - 5.0    Non-HDL Cholesterol 105 <130 mg/dL   LDL CALCULATION 81 50 - 99 mg/dL   VLDL CALCULATION 24 8 - 30 mg/dL   LDL/HDL Ratio 1.3         Assessment         ICD-10-CM ICD-9-CM    1. Paroxysmal supraventricular tachycardia (HCC)  I47.1 427.0     Stable symptom control continue treatment   2. Essential hypertension  I10 401.9     Stable monitor   3. Hyperlipidemia, unspecified hyperlipidemia type  E78.5 272.4     Continue treatment follow-up lab with PCP   4. VALERY (obstructive sleep apnea)  G47.33 327.23     Continue treatment. Monitor with PCP   10/2017-does not want sleep apnea study at this time  9/2020  Cardiac status stable. Recent lab reviewed continue medical management.   Symptoms of PSVT are fairly controlled  Medications Discontinued During This Encounter   Medication Reason    metoprolol succinate (TOPROL-XL) 100 mg tablet Reorder       Orders Placed This Encounter    metoprolol succinate (TOPROL-XL) 100 mg tablet     Sig: TAKE 1 TABLET BY MOUTH EVERY DAY     Dispense:  90 Tab     Refill:  1       Follow-up and Dispositions    · Return in about 6 months (around 3/9/2021).

## 2020-09-16 RX ORDER — ATORVASTATIN CALCIUM 20 MG/1
20 TABLET, FILM COATED ORAL DAILY
Qty: 90 TAB | Refills: 1 | Status: SHIPPED | OUTPATIENT
Start: 2020-09-16 | End: 2021-03-08

## 2020-09-16 RX ORDER — AMLODIPINE BESYLATE 10 MG/1
10 TABLET ORAL DAILY
Qty: 90 TAB | Refills: 1 | Status: SHIPPED | OUTPATIENT
Start: 2020-09-16 | End: 2021-03-10

## 2020-12-17 RX ORDER — METOPROLOL SUCCINATE 100 MG/1
TABLET, EXTENDED RELEASE ORAL
Qty: 90 TAB | Refills: 1 | Status: SHIPPED | OUTPATIENT
Start: 2020-12-17 | End: 2021-09-03

## 2021-03-08 RX ORDER — ATORVASTATIN CALCIUM 20 MG/1
TABLET, FILM COATED ORAL
Qty: 90 TAB | Refills: 1 | Status: SHIPPED | OUTPATIENT
Start: 2021-03-08 | End: 2021-08-31

## 2021-03-10 ENCOUNTER — OFFICE VISIT (OUTPATIENT)
Dept: CARDIOLOGY CLINIC | Age: 66
End: 2021-03-10
Payer: MEDICARE

## 2021-03-10 VITALS
OXYGEN SATURATION: 93 % | HEIGHT: 64 IN | DIASTOLIC BLOOD PRESSURE: 82 MMHG | WEIGHT: 187.2 LBS | TEMPERATURE: 98.1 F | HEART RATE: 54 BPM | SYSTOLIC BLOOD PRESSURE: 157 MMHG | BODY MASS INDEX: 31.96 KG/M2

## 2021-03-10 DIAGNOSIS — G47.33 OSA (OBSTRUCTIVE SLEEP APNEA): ICD-10-CM

## 2021-03-10 DIAGNOSIS — I10 ESSENTIAL HYPERTENSION: ICD-10-CM

## 2021-03-10 DIAGNOSIS — E78.5 HYPERLIPIDEMIA, UNSPECIFIED HYPERLIPIDEMIA TYPE: ICD-10-CM

## 2021-03-10 DIAGNOSIS — I47.1 PAROXYSMAL SUPRAVENTRICULAR TACHYCARDIA (HCC): Primary | ICD-10-CM

## 2021-03-10 PROCEDURE — G9717 DOC PT DX DEP/BP F/U NT REQ: HCPCS | Performed by: INTERNAL MEDICINE

## 2021-03-10 PROCEDURE — G8399 PT W/DXA RESULTS DOCUMENT: HCPCS | Performed by: INTERNAL MEDICINE

## 2021-03-10 PROCEDURE — G8753 SYS BP > OR = 140: HCPCS | Performed by: INTERNAL MEDICINE

## 2021-03-10 PROCEDURE — G8427 DOCREV CUR MEDS BY ELIG CLIN: HCPCS | Performed by: INTERNAL MEDICINE

## 2021-03-10 PROCEDURE — 1090F PRES/ABSN URINE INCON ASSESS: CPT | Performed by: INTERNAL MEDICINE

## 2021-03-10 PROCEDURE — 1101F PT FALLS ASSESS-DOCD LE1/YR: CPT | Performed by: INTERNAL MEDICINE

## 2021-03-10 PROCEDURE — G8754 DIAS BP LESS 90: HCPCS | Performed by: INTERNAL MEDICINE

## 2021-03-10 PROCEDURE — G8536 NO DOC ELDER MAL SCRN: HCPCS | Performed by: INTERNAL MEDICINE

## 2021-03-10 PROCEDURE — 99214 OFFICE O/P EST MOD 30 MIN: CPT | Performed by: INTERNAL MEDICINE

## 2021-03-10 PROCEDURE — G8417 CALC BMI ABV UP PARAM F/U: HCPCS | Performed by: INTERNAL MEDICINE

## 2021-03-10 PROCEDURE — 3017F COLORECTAL CA SCREEN DOC REV: CPT | Performed by: INTERNAL MEDICINE

## 2021-03-10 RX ORDER — AMLODIPINE BESYLATE 10 MG/1
TABLET ORAL
Qty: 90 TAB | Refills: 1 | Status: SHIPPED | OUTPATIENT
Start: 2021-03-10 | End: 2021-08-31

## 2021-03-10 RX ORDER — CLOBETASOL PROPIONATE 0.5 MG/G
OINTMENT TOPICAL AS NEEDED
COMMUNITY

## 2021-03-10 NOTE — PROGRESS NOTES
Patient brought medications list.    1. Have you been to the ER, urgent care clinic since your last visit? Hospitalized since your last visit? No    2. Have you seen or consulted any other health care providers outside of the [address] [place] [address] since your last visit? Include any pap smears or colon screening.  No

## 2021-03-10 NOTE — PROGRESS NOTES
HISTORY OF PRESENT ILLNESS  Lefty Adams is a 76 y.o. female. Patient with svt,htn,ghazala  3/2021  Patient seen today for follow-up. She has occasional palpitation that are stable. Denies any shortness of breath or chest pain. Mild edema at times in the leg. Hypertension  The history is provided by the patient. This is a chronic problem. The problem occurs constantly. The problem has not changed since onset. Pertinent negatives include no chest pain, no abdominal pain, no headaches and no shortness of breath. Palpitations   The history is provided by the patient. This is a recurrent problem. The problem has been gradually worsening. The problem occurs every several days. The problem is associated with nothing. Pertinent negatives include no fever, no chest pain, no claudication, no orthopnea, no PND, no abdominal pain, no nausea, no vomiting, no headaches, no dizziness, no weakness, no cough, no hemoptysis, no shortness of breath and no sputum production. Her past medical history is significant for hypertension. SVT  The history is provided by the patient. This is a new problem. The current episode started more than 2 days ago. The problem occurs every several days. The problem has been resolved. Pertinent negatives include no chest pain, no abdominal pain, no headaches and no shortness of breath. Nothing aggravates the symptoms. Nothing relieves the symptoms. Review of Systems   Constitutional: Negative for chills and fever. HENT: Negative for nosebleeds. Eyes: Negative for blurred vision and double vision. Respiratory: Negative for cough, hemoptysis, sputum production, shortness of breath and wheezing. Cardiovascular: Positive for palpitations. Negative for chest pain, orthopnea, claudication, leg swelling and PND. Gastrointestinal: Negative for abdominal pain, heartburn, nausea and vomiting. Musculoskeletal: Negative for myalgias. Skin: Negative for rash.    Neurological: Negative for dizziness, weakness and headaches. Endo/Heme/Allergies: Does not bruise/bleed easily. Family History   Problem Relation Age of Onset    Heart Attack Neg Hx     Heart Surgery Neg Hx     Hypertension Mother    [employer] Pacemaker Father     Hypertension Maternal Grandmother        Past Medical History:   Diagnosis Date    Essential hypertension 9/10/2015    Hyperlipidemia 2017       No past surgical history on file. Social History     Tobacco Use    Smoking status: Former Smoker     Quit date: 9/10/2008     Years since quittin.5    Smokeless tobacco: Never Used   Substance Use Topics    Alcohol use: No       Allergies   Allergen Reactions    Pcn [Penicillins] Hives           Visit Vitals  BP (!) 157/82 (BP 1 Location: Right arm, BP Patient Position: Sitting, BP Cuff Size: Adult)   Pulse (!) 54   Temp 98.1 °F (36.7 °C) (Temporal)   Ht 5' 4\" (1.626 m)   Wt 84.9 kg (187 lb 3.2 oz)   SpO2 93%   BMI 32.13 kg/m²         Physical Exam   Constitutional: She is oriented to person, place, and time. She appears well-developed and well-nourished. HENT:   Head: Normocephalic and atraumatic. Eyes: Conjunctivae are normal.   Neck: Neck supple. No JVD present. No tracheal deviation present. No thyromegaly present. Cardiovascular: Normal rate and regular rhythm. PMI is not displaced. Exam reveals no gallop, no S3 and no decreased pulses. No murmur heard. Pulmonary/Chest: No respiratory distress. She has no wheezes. She has no rales. She exhibits no tenderness. Abdominal: Soft. There is no abdominal tenderness. Musculoskeletal:         General: No edema. Neurological: She is alert and oriented to person, place, and time. Skin: Skin is warm. Psychiatric: She has a normal mood and affect. Ms. Vicenta Kam has a reminder for a \"due or due soon\" health maintenance. I have asked that she contact her primary care provider for follow-up on this health maintenance.     I have personally reviewed patient's records available from hospital and other providers and incorporated findings in patient care. Er-9/2015  I Have personally reviewed recent relevant labs available and discussed with patient  9/2015    I have personally reviewed patients ekg done at other facility. Patient had stress test done 1 year ago and reportedly normal at office in [address] [place] [address]  Left ventricle: Systolic function was normal by visual assessment. Ejection fraction was estimated to be 60 %. There were no regional wall  motion abnormalities. Atrial septum: There was no evidence of intracardiac shunt by  peripherally-administered agitated saline contrast.    Right atrium: The atrium was mildly dilated. Mitral valve: There was trivial regurgitation. Tricuspid valve: There was mild regurgitation. Tricuspid regurgitation  peak velocity: 2.3 m/sec. Pulmonary artery systolic pressure: 24 mmHg. I Have personally reviewed recent relevant labs available and discussed with patient  9/2016    Lipid Complete PanelResulted: 12/3/2019 5:31 PM  [address]  Component Name Value Ref Range   Cholesterol 169 110 - 200 mg/dL   Triglyceride 119 40 - 149 mg/dL   HDL 64 >=40 mg/dL   Cholesterol/HDL 2.6 0.0 - 5.0    Non-HDL Cholesterol 105 <130 mg/dL   LDL CALCULATION 81 50 - 99 mg/dL   VLDL CALCULATION 24 8 - 30 mg/dL   LDL/HDL Ratio 1.3       Lab reviewlipid, LFT2/2021 high triglyceride  Assessment         ICD-10-CM ICD-9-CM    1. Paroxysmal supraventricular tachycardia (HCC)  I47.1 427.0     Occasional episode of palpitation self relieved continue treatment   2. Essential hypertension  I10 401.9     Mildly elevated systolic pressure usually at home will monitor   3. Hyperlipidemia, unspecified hyperlipidemia type  E78.5 272.4     Recent lab reviewed. High triglyceride. Continue with carb controlled on current treatment   4. VALERY (obstructive sleep apnea)  G47.33 327.23     Currently not on CPAP. Monitor with PCP and sleep physician   10/2017-does not want sleep apnea study at this time  9/2020  Cardiac status stable. Recent lab reviewed continue medical management. Symptoms of PSVT are fairly controlled  3/2021  Stable cardiac status. SVT controlled. Continue current treatment. Mildly elevated blood pressure which usually runs normal at home she will monitor at home. There are no discontinued medications. No orders of the defined types were placed in this encounter. Follow-up and Dispositions    · Return in about 6 months (around 9/10/2021).

## 2021-08-31 RX ORDER — ATORVASTATIN CALCIUM 20 MG/1
TABLET, FILM COATED ORAL
Qty: 90 TABLET | Refills: 1 | Status: SHIPPED | OUTPATIENT
Start: 2021-08-31 | End: 2022-02-14

## 2021-08-31 RX ORDER — AMLODIPINE BESYLATE 10 MG/1
TABLET ORAL
Qty: 90 TABLET | Refills: 1 | Status: SHIPPED | OUTPATIENT
Start: 2021-08-31 | End: 2022-02-25

## 2021-09-03 RX ORDER — METOPROLOL SUCCINATE 100 MG/1
TABLET, EXTENDED RELEASE ORAL
Qty: 90 TABLET | Refills: 1 | Status: SHIPPED | OUTPATIENT
Start: 2021-09-03 | End: 2022-02-25

## 2021-09-29 ENCOUNTER — OFFICE VISIT (OUTPATIENT)
Dept: CARDIOLOGY CLINIC | Age: 66
End: 2021-09-29
Payer: MEDICARE

## 2021-09-29 VITALS
SYSTOLIC BLOOD PRESSURE: 135 MMHG | HEART RATE: 52 BPM | DIASTOLIC BLOOD PRESSURE: 66 MMHG | WEIGHT: 187 LBS | OXYGEN SATURATION: 96 % | BODY MASS INDEX: 32.1 KG/M2

## 2021-09-29 DIAGNOSIS — G47.33 OSA (OBSTRUCTIVE SLEEP APNEA): ICD-10-CM

## 2021-09-29 DIAGNOSIS — I10 ESSENTIAL HYPERTENSION: ICD-10-CM

## 2021-09-29 DIAGNOSIS — I47.1 PAROXYSMAL SUPRAVENTRICULAR TACHYCARDIA (HCC): Primary | ICD-10-CM

## 2021-09-29 DIAGNOSIS — E78.5 HYPERLIPIDEMIA, UNSPECIFIED HYPERLIPIDEMIA TYPE: ICD-10-CM

## 2021-09-29 PROCEDURE — G8754 DIAS BP LESS 90: HCPCS | Performed by: INTERNAL MEDICINE

## 2021-09-29 PROCEDURE — G8399 PT W/DXA RESULTS DOCUMENT: HCPCS | Performed by: INTERNAL MEDICINE

## 2021-09-29 PROCEDURE — G9717 DOC PT DX DEP/BP F/U NT REQ: HCPCS | Performed by: INTERNAL MEDICINE

## 2021-09-29 PROCEDURE — 1090F PRES/ABSN URINE INCON ASSESS: CPT | Performed by: INTERNAL MEDICINE

## 2021-09-29 PROCEDURE — 99214 OFFICE O/P EST MOD 30 MIN: CPT | Performed by: INTERNAL MEDICINE

## 2021-09-29 PROCEDURE — G8752 SYS BP LESS 140: HCPCS | Performed by: INTERNAL MEDICINE

## 2021-09-29 PROCEDURE — 1101F PT FALLS ASSESS-DOCD LE1/YR: CPT | Performed by: INTERNAL MEDICINE

## 2021-09-29 PROCEDURE — G8427 DOCREV CUR MEDS BY ELIG CLIN: HCPCS | Performed by: INTERNAL MEDICINE

## 2021-09-29 PROCEDURE — 3017F COLORECTAL CA SCREEN DOC REV: CPT | Performed by: INTERNAL MEDICINE

## 2021-09-29 PROCEDURE — G8536 NO DOC ELDER MAL SCRN: HCPCS | Performed by: INTERNAL MEDICINE

## 2021-09-29 PROCEDURE — G8417 CALC BMI ABV UP PARAM F/U: HCPCS | Performed by: INTERNAL MEDICINE

## 2021-09-29 NOTE — PROGRESS NOTES
HISTORY OF PRESENT ILLNESS  Zia Sutton is a 78 y.o. female. Patient with svt,htn,ghazala  3/2021  Patient seen today for follow-up. She has occasional palpitation that are stable. Denies any shortness of breath or chest pain. Mild edema at times in the leg.  9/2021  Stable cardiac status occasional palpitations stable. No shortness of breath or chest pain    Hypertension  The history is provided by the patient. This is a chronic problem. The problem occurs constantly. The problem has not changed since onset. Pertinent negatives include no chest pain, no abdominal pain, no headaches and no shortness of breath. Palpitations   The history is provided by the patient. This is a recurrent problem. The problem has been gradually worsening. The problem occurs every several days. The problem is associated with nothing. Pertinent negatives include no fever, no chest pain, no claudication, no orthopnea, no PND, no abdominal pain, no nausea, no vomiting, no headaches, no dizziness, no weakness, no cough, no hemoptysis, no shortness of breath and no sputum production. Her past medical history is significant for hypertension. SVT  The history is provided by the patient. This is a new problem. The current episode started more than 2 days ago. The problem occurs every several days. The problem has been resolved. Pertinent negatives include no chest pain, no abdominal pain, no headaches and no shortness of breath. Nothing aggravates the symptoms. Nothing relieves the symptoms. Follow-up  Pertinent negatives include no chest pain, no abdominal pain, no headaches and no shortness of breath. Review of Systems   Constitutional: Negative for chills and fever. HENT: Negative for nosebleeds. Eyes: Negative for blurred vision and double vision. Respiratory: Negative for cough, hemoptysis, sputum production, shortness of breath and wheezing. Cardiovascular: Positive for palpitations.  Negative for chest pain, orthopnea, claudication, leg swelling and PND. Gastrointestinal: Negative for abdominal pain, heartburn, nausea and vomiting. Musculoskeletal: Negative for myalgias. Skin: Negative for rash. Neurological: Negative for dizziness, weakness and headaches. Endo/Heme/Allergies: Does not bruise/bleed easily. Family History   Problem Relation Age of Onset    Heart Attack Neg Hx     Heart Surgery Neg Hx     Hypertension Mother    [employer] Pacemaker Father     Hypertension Maternal Grandmother        Past Medical History:   Diagnosis Date    Essential hypertension 9/10/2015    Hyperlipidemia 2017       No past surgical history on file. Social History     Tobacco Use    Smoking status: Former Smoker     Quit date: 9/10/2008     Years since quittin.0    Smokeless tobacco: Never Used   Substance Use Topics    Alcohol use: No       Allergies   Allergen Reactions    Pcn [Penicillins] Hives           Visit Vitals  /66 (BP 1 Location: Left upper arm, BP Patient Position: Sitting, BP Cuff Size: Adult)   Pulse (!) 52   Ht (P) 5' 4\" (1.626 m)   Wt 84.8 kg (187 lb)   SpO2 96%   BMI (P) 32.10 kg/m²         Physical Exam  Constitutional:       Appearance: She is well-developed. HENT:      Head: Normocephalic and atraumatic. Eyes:      Conjunctiva/sclera: Conjunctivae normal.   Neck:      Thyroid: No thyromegaly. Vascular: No JVD. Trachea: No tracheal deviation. Cardiovascular:      Rate and Rhythm: Normal rate and regular rhythm. Chest Wall: PMI is not displaced. Pulses: No decreased pulses. Heart sounds: No murmur heard. No gallop. No S3 sounds. Pulmonary:      Effort: No respiratory distress. Breath sounds: No wheezing or rales. Chest:      Chest wall: No tenderness. Abdominal:      Palpations: Abdomen is soft. Tenderness: There is no abdominal tenderness. Musculoskeletal:      Cervical back: Neck supple. Skin:     General: Skin is warm. Neurological:      Mental Status: She is alert and oriented to person, place, and time. Ms. Marciano Murillo has a reminder for a \"due or due soon\" health maintenance. I have asked that she contact her primary care provider for follow-up on this health maintenance. I have personally reviewed patient's records available from hospital and other providers and incorporated findings in patient care. Er-9/2015  I Have personally reviewed recent relevant labs available and discussed with patient  9/2015    I have personally reviewed patients ekg done at other facility. Patient had stress test done 1 year ago and reportedly normal at office in [address] [place] [address]  Left ventricle: Systolic function was normal by visual assessment. Ejection fraction was estimated to be 60 %. There were no regional wall  motion abnormalities. Atrial septum: There was no evidence of intracardiac shunt by  peripherally-administered agitated saline contrast.    Right atrium: The atrium was mildly dilated. Mitral valve: There was trivial regurgitation. Tricuspid valve: There was mild regurgitation. Tricuspid regurgitation  peak velocity: 2.3 m/sec. Pulmonary artery systolic pressure: 24 mmHg. I Have personally reviewed recent relevant labs available and discussed with patient  9/2016    Lipid Complete PanelResulted: 12/3/2019 5:31 PM  [address]  Component Name Value Ref Range   Cholesterol 169 110 - 200 mg/dL   Triglyceride 119 40 - 149 mg/dL   HDL 64 >=40 mg/dL   Cholesterol/HDL 2.6 0.0 - 5.0    Non-HDL Cholesterol 105 <130 mg/dL   LDL CALCULATION 81 50 - 99 mg/dL   VLDL CALCULATION 24 8 - 30 mg/dL   LDL/HDL Ratio 1.3       Lab reviewlipid, LFT2/2021 high triglyceride  Assessment         ICD-10-CM ICD-9-CM    1. Paroxysmal supraventricular tachycardia (HCC)  I47.1 427.0     Few short lasting palpitation. Symptoms are stable continue treatment monitor   2.  Essential hypertension  I10 401.9     Stable continue treatment   3. Hyperlipidemia, unspecified hyperlipidemia type  E78.5 272.4     Continue treatment follow-up lab with PCP   4. VALERY (obstructive sleep apnea)  G47.33 327.23     Continue therapy follow-up with PCP and sleep physician   10/2017-does not want sleep apnea study at this time  9/2020  Cardiac status stable. Recent lab reviewed continue medical management. Symptoms of PSVT are fairly controlled  3/2021  Stable cardiac status. SVT controlled. Continue current treatment. Mildly elevated blood pressure which usually runs normal at home she will monitor at home. 9/2021  Stable cardiac status SVT controlled. Rare palpitation. Short lasting. Blood pressure controlled lab with PCP. There are no discontinued medications. No orders of the defined types were placed in this encounter. Follow-up and Dispositions    · Return in about 6 months (around 3/29/2022).

## 2021-09-29 NOTE — PROGRESS NOTES
1. Have you been to the ER, urgent care clinic since your last visit? Hospitalized since your last visit? No    2. Have you seen or consulted any other health care providers outside of the [address] [place] [address] since your last visit? Include any pap smears or colon screening.  No

## 2022-02-14 RX ORDER — ATORVASTATIN CALCIUM 20 MG/1
TABLET, FILM COATED ORAL
Qty: 90 TABLET | Refills: 1 | Status: SHIPPED | OUTPATIENT
Start: 2022-02-14 | End: 2022-05-04 | Stop reason: SDUPTHER

## 2022-02-25 RX ORDER — METOPROLOL SUCCINATE 100 MG/1
TABLET, EXTENDED RELEASE ORAL
Qty: 90 TABLET | Refills: 1 | Status: SHIPPED | OUTPATIENT
Start: 2022-02-25 | End: 2022-08-31

## 2022-02-25 RX ORDER — AMLODIPINE BESYLATE 10 MG/1
TABLET ORAL
Qty: 90 TABLET | Refills: 1 | Status: SHIPPED | OUTPATIENT
Start: 2022-02-25 | End: 2022-08-31

## 2022-03-19 PROBLEM — E78.5 HYPERLIPIDEMIA: Status: ACTIVE | Noted: 2017-04-20

## 2022-03-19 PROBLEM — F32.A DEPRESSION: Status: ACTIVE | Noted: 2018-04-19

## 2022-05-04 ENCOUNTER — OFFICE VISIT (OUTPATIENT)
Dept: CARDIOLOGY CLINIC | Age: 67
End: 2022-05-04
Payer: MEDICARE

## 2022-05-04 VITALS
WEIGHT: 187 LBS | HEART RATE: 55 BPM | BODY MASS INDEX: 31.92 KG/M2 | SYSTOLIC BLOOD PRESSURE: 135 MMHG | OXYGEN SATURATION: 97 % | DIASTOLIC BLOOD PRESSURE: 66 MMHG | HEIGHT: 64 IN

## 2022-05-04 DIAGNOSIS — I47.1 PAROXYSMAL SUPRAVENTRICULAR TACHYCARDIA (HCC): Primary | ICD-10-CM

## 2022-05-04 DIAGNOSIS — E78.5 HYPERLIPIDEMIA, UNSPECIFIED HYPERLIPIDEMIA TYPE: ICD-10-CM

## 2022-05-04 DIAGNOSIS — G47.33 OSA (OBSTRUCTIVE SLEEP APNEA): ICD-10-CM

## 2022-05-04 DIAGNOSIS — I10 ESSENTIAL HYPERTENSION: ICD-10-CM

## 2022-05-04 PROCEDURE — 3017F COLORECTAL CA SCREEN DOC REV: CPT | Performed by: INTERNAL MEDICINE

## 2022-05-04 PROCEDURE — 1101F PT FALLS ASSESS-DOCD LE1/YR: CPT | Performed by: INTERNAL MEDICINE

## 2022-05-04 PROCEDURE — G8536 NO DOC ELDER MAL SCRN: HCPCS | Performed by: INTERNAL MEDICINE

## 2022-05-04 PROCEDURE — 1090F PRES/ABSN URINE INCON ASSESS: CPT | Performed by: INTERNAL MEDICINE

## 2022-05-04 PROCEDURE — G8752 SYS BP LESS 140: HCPCS | Performed by: INTERNAL MEDICINE

## 2022-05-04 PROCEDURE — 99214 OFFICE O/P EST MOD 30 MIN: CPT | Performed by: INTERNAL MEDICINE

## 2022-05-04 PROCEDURE — G8399 PT W/DXA RESULTS DOCUMENT: HCPCS | Performed by: INTERNAL MEDICINE

## 2022-05-04 PROCEDURE — G9717 DOC PT DX DEP/BP F/U NT REQ: HCPCS | Performed by: INTERNAL MEDICINE

## 2022-05-04 PROCEDURE — G8754 DIAS BP LESS 90: HCPCS | Performed by: INTERNAL MEDICINE

## 2022-05-04 PROCEDURE — G8417 CALC BMI ABV UP PARAM F/U: HCPCS | Performed by: INTERNAL MEDICINE

## 2022-05-04 PROCEDURE — G8427 DOCREV CUR MEDS BY ELIG CLIN: HCPCS | Performed by: INTERNAL MEDICINE

## 2022-05-04 RX ORDER — ATORVASTATIN CALCIUM 40 MG/1
40 TABLET, FILM COATED ORAL DAILY
Qty: 90 TABLET | Refills: 3 | Status: SHIPPED | OUTPATIENT
Start: 2022-05-04

## 2022-05-04 NOTE — PROGRESS NOTES
1. Have you been to the ER, urgent care clinic since your last visit? Hospitalized since your last visit? Yes When: 1/2022 Where: [facility] Reason for visit: surgery    2. Have you seen or consulted any other health care providers outside of the [address] [place] [address] since your last visit? Include any pap smears or colon screening. Yes Where: Dr. Shimon Lopez     3. Since your last visit, have you had any of the following symptoms?      palpitations.

## 2022-05-04 NOTE — PROGRESS NOTES
HISTORY OF PRESENT ILLNESS  Danna Weiss is a 75 y.o. female. Patient with svt,htn,ghazala  3/2021  Patient seen today for follow-up. She has occasional palpitation that are stable. Denies any shortness of breath or chest pain. Mild edema at times in the leg.  9/2021  Stable cardiac status occasional palpitations stable. No shortness of breath or chest pain    Follow-up  Pertinent negatives include no chest pain, no abdominal pain, no headaches and no shortness of breath. Hypertension  The history is provided by the patient. This is a chronic problem. The problem occurs constantly. The problem has not changed since onset. Pertinent negatives include no chest pain, no abdominal pain, no headaches and no shortness of breath. Palpitations   The history is provided by the patient. This is a recurrent problem. The problem has been gradually worsening. The problem occurs every several days. The problem is associated with nothing. Pertinent negatives include no fever, no chest pain, no claudication, no orthopnea, no PND, no abdominal pain, no nausea, no vomiting, no headaches, no dizziness, no weakness, no cough, no hemoptysis, no shortness of breath and no sputum production. Her past medical history is significant for hypertension. SVT  The history is provided by the patient. This is a new problem. The current episode started more than 2 days ago. The problem occurs every several days. The problem has been resolved. Pertinent negatives include no chest pain, no abdominal pain, no headaches and no shortness of breath. Nothing aggravates the symptoms. Nothing relieves the symptoms. Review of Systems   Constitutional: Negative for chills and fever. HENT: Negative for nosebleeds. Eyes: Negative for blurred vision and double vision. Respiratory: Negative for cough, hemoptysis, sputum production, shortness of breath and wheezing. Cardiovascular: Positive for palpitations.  Negative for chest pain, orthopnea, claudication, leg swelling and PND. Gastrointestinal: Negative for abdominal pain, heartburn, nausea and vomiting. Musculoskeletal: Negative for myalgias. Skin: Negative for rash. Neurological: Negative for dizziness, weakness and headaches. Endo/Heme/Allergies: Does not bruise/bleed easily. Family History   Problem Relation Age of Onset    Heart Attack Neg Hx     Heart Surgery Neg Hx     Hypertension Mother    Farzana Vasquez Pacemaker Father     Hypertension Maternal Grandmother        Past Medical History:   Diagnosis Date    Essential hypertension 9/10/2015    Hyperlipidemia 2017       No past surgical history on file. Social History     Tobacco Use    Smoking status: Former Smoker     Quit date: 9/10/2008     Years since quittin.6    Smokeless tobacco: Never Used   Substance Use Topics    Alcohol use: No       Allergies   Allergen Reactions    Pcn [Penicillins] Hives           Visit Vitals  /66   Pulse (!) 55   Ht 5' 4\" (1.626 m)   Wt 84.8 kg (187 lb)   SpO2 97%   BMI 32.10 kg/m²         Physical Exam  Constitutional:       Appearance: She is well-developed. HENT:      Head: Normocephalic and atraumatic. Eyes:      Conjunctiva/sclera: Conjunctivae normal.   Neck:      Thyroid: No thyromegaly. Vascular: No JVD. Trachea: No tracheal deviation. Cardiovascular:      Rate and Rhythm: Normal rate and regular rhythm. Chest Wall: PMI is not displaced. Pulses: No decreased pulses. Heart sounds: No murmur heard. No gallop. No S3 sounds. Pulmonary:      Effort: No respiratory distress. Breath sounds: No wheezing or rales. Chest:      Chest wall: No tenderness. Abdominal:      Palpations: Abdomen is soft. Tenderness: There is no abdominal tenderness. Musculoskeletal:      Cervical back: Neck supple. Skin:     General: Skin is warm. Neurological:      Mental Status: She is alert and oriented to person, place, and time.          Ms. Domitila has a reminder for a \"due or due soon\" health maintenance. I have asked that she contact her primary care provider for follow-up on this health maintenance. I have personally reviewed patient's records available from hospital and other providers and incorporated findings in patient care. Er-9/2015  I Have personally reviewed recent relevant labs available and discussed with patient  9/2015    I have personally reviewed patients ekg done at other facility. Patient had stress test done 1 year ago and reportedly normal at office in [address] [place] [address]  Left ventricle: Systolic function was normal by visual assessment. Ejection fraction was estimated to be 60 %. There were no regional wall  motion abnormalities. Atrial septum: There was no evidence of intracardiac shunt by  peripherally-administered agitated saline contrast.    Right atrium: The atrium was mildly dilated. Mitral valve: There was trivial regurgitation. Tricuspid valve: There was mild regurgitation. Tricuspid regurgitation  peak velocity: 2.3 m/sec. Pulmonary artery systolic pressure: 24 mmHg.     I Have personally reviewed recent relevant labs available and discussed with patient  9/2016    Lipid Complete PanelResulted: 12/3/2019 5:31 PM  [address]  Component Name Value Ref Range   Cholesterol 169 110 - 200 mg/dL   Triglyceride 119 40 - 149 mg/dL   HDL 64 >=40 mg/dL   Cholesterol/HDL 2.6 0.0 - 5.0    Non-HDL Cholesterol 105 <130 mg/dL   LDL CALCULATION 81 50 - 99 mg/dL   VLDL CALCULATION 24 8 - 30 mg/dL   LDL/HDL Ratio 1.3       Lab review-lipid, LFT-2/2021 high triglyceride    Component 02/23/22 02/17/21 12/03/19 07/25/19 07/26/18 11/07/16   Cholesterol 199 185 169 192 201 High  224 High    Triglyceride 144 201 High  119 162 High  152 High  133   HDL 63 65 64 58 66 High  81 High    Cholesterol/HDL 3.2 2.8 2.6 3.3 3.0 --   Non-HDL Cholesterol 136 120 105 -- -- --   LDL CALCULATION 107 80 81 102 High  105 High  117 High    VLDL CALCULATION 29 40 High  24 32 High  30 27       Assessment         ICD-10-CM ICD-9-CM    1. Paroxysmal supraventricular tachycardia (HCC)  I47.1 427.0     Stable on medical management continue therapy   2. Essential hypertension  I10 401.9     Stable monitor   3. Hyperlipidemia, unspecified hyperlipidemia type  E78.5 272.4     Elevated LDL increase atorvastatin to 40 mg a day. Follow-up lab in about 4 to 6 weeks with PCP   4. VALERY (obstructive sleep apnea)  G47.33 327.23     Stable monitor   10/2017-does not want sleep apnea study at this time  9/2020  Cardiac status stable. Recent lab reviewed continue medical management. Symptoms of PSVT are fairly controlled  3/2021  Stable cardiac status. SVT controlled. Continue current treatment. Mildly elevated blood pressure which usually runs normal at home she will monitor at home. 9/2021  Stable cardiac status SVT controlled. Rare palpitation. Short lasting. Blood pressure controlled lab with PCP.  5/2022  Cardiac status stable. Symptoms are fairly controlled continue treatment. LDL from 2/2022 was 107. Patient has borderline diabetes in view of that we will increase atorvastatin to 40 mg a day. Lab with PCP      Medications Discontinued During This Encounter   Medication Reason    atorvastatin (LIPITOR) 20 mg tablet REORDER       Orders Placed This Encounter    atorvastatin (LIPITOR) 40 mg tablet     Sig: Take 1 Tablet by mouth daily. Dispense:  90 Tablet     Refill:  3       Follow-up and Dispositions    · Return in about 6 months (around 11/4/2022).

## 2022-08-31 RX ORDER — AMLODIPINE BESYLATE 10 MG/1
TABLET ORAL
Qty: 90 TABLET | Refills: 1 | Status: SHIPPED | OUTPATIENT
Start: 2022-08-31

## 2022-08-31 RX ORDER — METOPROLOL SUCCINATE 100 MG/1
TABLET, EXTENDED RELEASE ORAL
Qty: 90 TABLET | Refills: 1 | Status: SHIPPED | OUTPATIENT
Start: 2022-08-31

## 2022-11-09 ENCOUNTER — OFFICE VISIT (OUTPATIENT)
Dept: CARDIOLOGY CLINIC | Age: 67
End: 2022-11-09
Payer: MEDICARE

## 2022-11-09 VITALS
WEIGHT: 194 LBS | OXYGEN SATURATION: 95 % | HEART RATE: 56 BPM | BODY MASS INDEX: 33.12 KG/M2 | HEIGHT: 64 IN | DIASTOLIC BLOOD PRESSURE: 66 MMHG | SYSTOLIC BLOOD PRESSURE: 149 MMHG

## 2022-11-09 DIAGNOSIS — E78.5 HYPERLIPIDEMIA, UNSPECIFIED HYPERLIPIDEMIA TYPE: ICD-10-CM

## 2022-11-09 DIAGNOSIS — I47.1 PAROXYSMAL SUPRAVENTRICULAR TACHYCARDIA (HCC): Primary | ICD-10-CM

## 2022-11-09 DIAGNOSIS — I10 ESSENTIAL HYPERTENSION: ICD-10-CM

## 2022-11-09 DIAGNOSIS — G47.33 OSA (OBSTRUCTIVE SLEEP APNEA): ICD-10-CM

## 2022-11-09 PROCEDURE — G8399 PT W/DXA RESULTS DOCUMENT: HCPCS | Performed by: INTERNAL MEDICINE

## 2022-11-09 PROCEDURE — G8753 SYS BP > OR = 140: HCPCS | Performed by: INTERNAL MEDICINE

## 2022-11-09 PROCEDURE — G8754 DIAS BP LESS 90: HCPCS | Performed by: INTERNAL MEDICINE

## 2022-11-09 PROCEDURE — 1123F ACP DISCUSS/DSCN MKR DOCD: CPT | Performed by: INTERNAL MEDICINE

## 2022-11-09 PROCEDURE — G8417 CALC BMI ABV UP PARAM F/U: HCPCS | Performed by: INTERNAL MEDICINE

## 2022-11-09 PROCEDURE — 99214 OFFICE O/P EST MOD 30 MIN: CPT | Performed by: INTERNAL MEDICINE

## 2022-11-09 PROCEDURE — 3078F DIAST BP <80 MM HG: CPT | Performed by: INTERNAL MEDICINE

## 2022-11-09 PROCEDURE — 3017F COLORECTAL CA SCREEN DOC REV: CPT | Performed by: INTERNAL MEDICINE

## 2022-11-09 PROCEDURE — G9717 DOC PT DX DEP/BP F/U NT REQ: HCPCS | Performed by: INTERNAL MEDICINE

## 2022-11-09 PROCEDURE — G8427 DOCREV CUR MEDS BY ELIG CLIN: HCPCS | Performed by: INTERNAL MEDICINE

## 2022-11-09 PROCEDURE — G8536 NO DOC ELDER MAL SCRN: HCPCS | Performed by: INTERNAL MEDICINE

## 2022-11-09 PROCEDURE — 1090F PRES/ABSN URINE INCON ASSESS: CPT | Performed by: INTERNAL MEDICINE

## 2022-11-09 PROCEDURE — 3074F SYST BP LT 130 MM HG: CPT | Performed by: INTERNAL MEDICINE

## 2022-11-09 PROCEDURE — 1101F PT FALLS ASSESS-DOCD LE1/YR: CPT | Performed by: INTERNAL MEDICINE

## 2022-11-09 NOTE — PROGRESS NOTES
HISTORY OF PRESENT ILLNESS  Zay Guzman is a 75 y.o. female. Patient with svt,htn,ghazala  3/2021  Patient seen today for follow-up. She has occasional palpitation that are stable. Denies any shortness of breath or chest pain. Mild edema at times in the leg.  9/2021  Stable cardiac status occasional palpitations stable. No shortness of breath or chest pain    Follow-up  Pertinent negatives include no chest pain, no abdominal pain, no headaches and no shortness of breath. Hypertension  The history is provided by the Patient. This is a chronic problem. The problem occurs constantly. The problem has not changed since onset. Pertinent negatives include no chest pain, no abdominal pain, no headaches and no shortness of breath. Palpitations   The history is provided by the Patient. This is a recurrent problem. The problem has been gradually worsening. The problem occurs every several days. The problem is associated with nothing. Pertinent negatives include no fever, no chest pain, no claudication, no orthopnea, no PND, no abdominal pain, no nausea, no vomiting, no headaches, no dizziness, no weakness, no cough, no hemoptysis, no shortness of breath and no sputum production. Her past medical history is significant for hypertension. SVT  The history is provided by the Patient. This is a new problem. The current episode started more than 2 days ago. The problem occurs every several days. The problem has been resolved. Pertinent negatives include no chest pain, no abdominal pain, no headaches and no shortness of breath. Nothing aggravates the symptoms. Nothing relieves the symptoms. Review of Systems   Constitutional:  Negative for chills and fever. HENT:  Negative for nosebleeds. Eyes:  Negative for blurred vision and double vision. Respiratory:  Negative for cough, hemoptysis, sputum production, shortness of breath and wheezing. Cardiovascular:  Positive for palpitations.  Negative for chest pain, orthopnea, claudication, leg swelling and PND. Gastrointestinal:  Negative for abdominal pain, heartburn, nausea and vomiting. Musculoskeletal:  Negative for myalgias. Skin:  Negative for rash. Neurological:  Negative for dizziness, weakness and headaches. Endo/Heme/Allergies:  Does not bruise/bleed easily. Family History   Problem Relation Age of Onset    Heart Attack Neg Hx     Heart Surgery Neg Hx     Hypertension Mother     Pacemaker Father     Hypertension Maternal Grandmother        Past Medical History:   Diagnosis Date    Essential hypertension 9/10/2015    Hyperlipidemia 2017       No past surgical history on file. Social History     Tobacco Use    Smoking status: Former     Types: Cigarettes     Quit date: 9/10/2008     Years since quittin.1    Smokeless tobacco: Never   Substance Use Topics    Alcohol use: No       Allergies   Allergen Reactions    Pcn [Penicillins] Hives           Visit Vitals  BP (!) 149/66   Pulse (!) 56   Ht 5' 4\" (1.626 m)   Wt 88 kg (194 lb)   SpO2 95%   BMI 33.30 kg/m²         Physical Exam  Constitutional:       Appearance: She is well-developed. HENT:      Head: Normocephalic and atraumatic. Eyes:      Conjunctiva/sclera: Conjunctivae normal.   Neck:      Thyroid: No thyromegaly. Vascular: No JVD. Trachea: No tracheal deviation. Cardiovascular:      Rate and Rhythm: Normal rate and regular rhythm. Chest Wall: PMI is not displaced. Pulses: No decreased pulses. Heart sounds: No murmur heard. No gallop. No S3 sounds. Pulmonary:      Effort: No respiratory distress. Breath sounds: No wheezing or rales. Chest:      Chest wall: No tenderness. Abdominal:      Palpations: Abdomen is soft. Tenderness: There is no abdominal tenderness. Musculoskeletal:      Cervical back: Neck supple. Skin:     General: Skin is warm. Neurological:      Mental Status: She is alert and oriented to person, place, and time. Ms. René Umanzor has a reminder for a \"due or due soon\" health maintenance. I have asked that she contact her primary care provider for follow-up on this health maintenance. I have personally reviewed patient's records available from hospital and other providers and incorporated findings in patient care. Er-9/2015  I Have personally reviewed recent relevant labs available and discussed with patient  9/2015    I have personally reviewed patients ekg done at other facility. Patient had stress test done 1 year ago and reportedly normal at office in [address] [place] [address]  Left ventricle: Systolic function was normal by visual assessment. Ejection fraction was estimated to be 60 %. There were no regional wall  motion abnormalities. Atrial septum: There was no evidence of intracardiac shunt by  peripherally-administered agitated saline contrast.    Right atrium: The atrium was mildly dilated. Mitral valve: There was trivial regurgitation. Tricuspid valve: There was mild regurgitation. Tricuspid regurgitation  peak velocity: 2.3 m/sec. Pulmonary artery systolic pressure: 24 mmHg.     I Have personally reviewed recent relevant labs available and discussed with patient  9/2016    Lipid Complete PanelResulted: 12/3/2019 5:31 PM  [address]  Component Name Value Ref Range   Cholesterol 169 110 - 200 mg/dL   Triglyceride 119 40 - 149 mg/dL   HDL 64 >=40 mg/dL   Cholesterol/HDL 2.6 0.0 - 5.0    Non-HDL Cholesterol 105 <130 mg/dL   LDL CALCULATION 81 50 - 99 mg/dL   VLDL CALCULATION 24 8 - 30 mg/dL   LDL/HDL Ratio 1.3       Lab review-lipid, LFT-2/2021 high triglyceride    Component 02/23/22 02/17/21 12/03/19 07/25/19 07/26/18 11/07/16   Cholesterol 199 185 169 192 201 High  224 High    Triglyceride 144 201 High  119 162 High  152 High  133   HDL 63 65 64 58 66 High  81 High    Cholesterol/HDL 3.2 2.8 2.6 3.3 3.0 --   Non-HDL Cholesterol 136 120 105 -- -- --   LDL CALCULATION 107 80 81 102 High  105 High  117 High    VLDL CALCULATION 29 40 High  24 32 High  30 27       Assessment         ICD-10-CM ICD-9-CM    1. Paroxysmal supraventricular tachycardia (HCC)  I47.1 427.0     Worse with recent anxiety but short lasting we will continue to monitor      2. Essential hypertension  I10 401.9     Elevated blood pressure in office continue monitoring labile. Home readings normal      3. Hyperlipidemia, unspecified hyperlipidemia type  E78.5 272.4     Continue treatment lab with PCP      4. VALERY (obstructive sleep apnea)  G47.33 327.23     Continue treatment monitor with PCP and sleep physician      10/2017-does not want sleep apnea study at this time  9/2020  Cardiac status stable. Recent lab reviewed continue medical management. Symptoms of PSVT are fairly controlled  3/2021  Stable cardiac status. SVT controlled. Continue current treatment. Mildly elevated blood pressure which usually runs normal at home she will monitor at home. 9/2021  Stable cardiac status SVT controlled. Rare palpitation. Short lasting. Blood pressure controlled lab with PCP.  5/2022  Cardiac status stable. Symptoms are fairly controlled continue treatment. LDL from 2/2022 was 107. Patient has borderline diabetes in view of that we will increase atorvastatin to 40 mg a day. Lab with PCP  11/2022  Palpitation worse at times with anxiety. Short lasting we will continue current dose of metoprolol. Blood pressure elevated here but labile. Usually runs normal at home. Continue monitoring  There are no discontinued medications. No orders of the defined types were placed in this encounter. Follow-up and Dispositions    Return in about 6 months (around 5/9/2023).

## 2022-11-09 NOTE — PROGRESS NOTES
1. Have you been to the ER, urgent care clinic since your last visit? Hospitalized since your last visit?     no    2. Have you seen or consulted any other health care providers outside of the [address] [place] [address] since your last visit? Include any pap smears or colon screening.       No

## 2023-02-27 RX ORDER — AMLODIPINE BESYLATE 10 MG/1
TABLET ORAL
Qty: 90 TABLET | Refills: 1 | OUTPATIENT
Start: 2023-02-27

## 2023-02-27 RX ORDER — METOPROLOL SUCCINATE 100 MG/1
TABLET, EXTENDED RELEASE ORAL
Qty: 90 TABLET | Refills: 1 | OUTPATIENT
Start: 2023-02-27

## 2023-03-16 RX ORDER — METOPROLOL SUCCINATE 100 MG/1
100 TABLET, EXTENDED RELEASE ORAL DAILY
Qty: 90 TABLET | Refills: 1 | Status: SHIPPED | OUTPATIENT
Start: 2023-03-16

## 2023-05-10 ENCOUNTER — OFFICE VISIT (OUTPATIENT)
Age: 68
End: 2023-05-10
Payer: MEDICARE

## 2023-05-10 VITALS
OXYGEN SATURATION: 96 % | BODY MASS INDEX: 30.73 KG/M2 | WEIGHT: 180 LBS | DIASTOLIC BLOOD PRESSURE: 62 MMHG | HEART RATE: 55 BPM | SYSTOLIC BLOOD PRESSURE: 113 MMHG | HEIGHT: 64 IN

## 2023-05-10 DIAGNOSIS — Z87.891 FORMER SMOKER, STOPPED SMOKING MANY YEARS AGO: ICD-10-CM

## 2023-05-10 DIAGNOSIS — G47.33 OBSTRUCTIVE SLEEP APNEA (ADULT) (PEDIATRIC): ICD-10-CM

## 2023-05-10 DIAGNOSIS — I10 ESSENTIAL (PRIMARY) HYPERTENSION: ICD-10-CM

## 2023-05-10 DIAGNOSIS — E78.5 HYPERLIPIDEMIA, UNSPECIFIED HYPERLIPIDEMIA TYPE: ICD-10-CM

## 2023-05-10 DIAGNOSIS — I47.1 SUPRAVENTRICULAR TACHYCARDIA (HCC): Primary | ICD-10-CM

## 2023-05-10 PROCEDURE — 3017F COLORECTAL CA SCREEN DOC REV: CPT | Performed by: INTERNAL MEDICINE

## 2023-05-10 PROCEDURE — 3074F SYST BP LT 130 MM HG: CPT | Performed by: INTERNAL MEDICINE

## 2023-05-10 PROCEDURE — 99214 OFFICE O/P EST MOD 30 MIN: CPT | Performed by: INTERNAL MEDICINE

## 2023-05-10 PROCEDURE — 4004F PT TOBACCO SCREEN RCVD TLK: CPT | Performed by: INTERNAL MEDICINE

## 2023-05-10 PROCEDURE — 3078F DIAST BP <80 MM HG: CPT | Performed by: INTERNAL MEDICINE

## 2023-05-10 PROCEDURE — G8399 PT W/DXA RESULTS DOCUMENT: HCPCS | Performed by: INTERNAL MEDICINE

## 2023-05-10 PROCEDURE — G8427 DOCREV CUR MEDS BY ELIG CLIN: HCPCS | Performed by: INTERNAL MEDICINE

## 2023-05-10 PROCEDURE — G8417 CALC BMI ABV UP PARAM F/U: HCPCS | Performed by: INTERNAL MEDICINE

## 2023-05-10 PROCEDURE — 1090F PRES/ABSN URINE INCON ASSESS: CPT | Performed by: INTERNAL MEDICINE

## 2023-05-10 PROCEDURE — 1123F ACP DISCUSS/DSCN MKR DOCD: CPT | Performed by: INTERNAL MEDICINE

## 2023-05-10 RX ORDER — FAMOTIDINE 20 MG/1
20 TABLET, FILM COATED ORAL 2 TIMES DAILY
COMMUNITY

## 2023-05-10 NOTE — PROGRESS NOTES
HISTORY OF PRESENT ILLNESS  Katina Acosta is a 75 y.o. female. Patient with svt,htn,jake  3/2021  Patient seen today for follow-up. She has occasional palpitation that are stable. Denies any shortness of breath or chest pain. Mild edema at times in the leg.  9/2021  Stable cardiac status occasional palpitations stable. No shortness of breath or chest pain    Follow-up  Pertinent negatives include no chest pain, no abdominal pain, no headaches and no shortness of breath. Hypertension  The history is provided by the Patient. This is a chronic problem. The problem occurs constantly. The problem has not changed since onset. Pertinent negatives include no chest pain, no abdominal pain, no headaches and no shortness of breath. Palpitations   The history is provided by the Patient. This is a recurrent problem. The problem has been gradually worsening. The problem occurs every several days. The problem is associated with nothing. Pertinent negatives include no fever, no chest pain, no claudication, no orthopnea, no PND, no abdominal pain, no nausea, no vomiting, no headaches, no dizziness, no weakness, no cough, no hemoptysis, no shortness of breath and no sputum production. Her past medical history is significant for hypertension. SVT  The history is provided by the Patient. This is a new problem. The current episode started more than 2 days ago. The problem occurs every several days. The problem has been resolved. Pertinent negatives include no chest pain, no abdominal pain, no headaches and no shortness of breath. Nothing aggravates the symptoms. Nothing relieves the symptoms. Review of Systems   Constitutional:  Negative for chills and fever. HENT:  Negative for nosebleeds. Eyes:  Negative for blurred vision and double vision. Respiratory:  Negative for cough, hemoptysis, sputum production, shortness of breath and wheezing. Cardiovascular:  Positive for palpitations.  Negative for chest pain,

## 2023-05-10 NOTE — PROGRESS NOTES
1. Have you been to the ER, urgent care clinic since your last visit? Hospitalized since your last visit? No    2. Have you seen or consulted any other health care providers outside of the [address] [place] [address] since your last visit? Include any pap smears or colon screening. Yes Where: pcp      3. Do you need any refills today?    no

## 2023-06-07 ENCOUNTER — HOSPITAL ENCOUNTER (OUTPATIENT)
Facility: HOSPITAL | Age: 68
Discharge: HOME | End: 2023-06-10
Payer: MEDICARE

## 2023-06-07 DIAGNOSIS — Z87.891 FORMER SMOKER, STOPPED SMOKING MANY YEARS AGO: ICD-10-CM

## 2023-06-07 PROCEDURE — 71271 CT THORAX LUNG CANCER SCR C-: CPT

## 2023-09-06 RX ORDER — METOPROLOL SUCCINATE 100 MG/1
TABLET, EXTENDED RELEASE ORAL
Qty: 90 TABLET | Refills: 1 | Status: SHIPPED | OUTPATIENT
Start: 2023-09-06

## 2023-10-12 RX ORDER — AMLODIPINE BESYLATE 10 MG/1
10 TABLET ORAL DAILY
Qty: 90 TABLET | Refills: 1 | Status: SHIPPED | OUTPATIENT
Start: 2023-10-12

## 2024-02-14 ENCOUNTER — OFFICE VISIT (OUTPATIENT)
Age: 69
End: 2024-02-14
Payer: MEDICARE

## 2024-02-14 VITALS
HEART RATE: 56 BPM | HEIGHT: 64 IN | SYSTOLIC BLOOD PRESSURE: 165 MMHG | WEIGHT: 187 LBS | OXYGEN SATURATION: 97 % | BODY MASS INDEX: 31.92 KG/M2 | DIASTOLIC BLOOD PRESSURE: 79 MMHG

## 2024-02-14 DIAGNOSIS — G47.33 OBSTRUCTIVE SLEEP APNEA (ADULT) (PEDIATRIC): ICD-10-CM

## 2024-02-14 DIAGNOSIS — I47.10 SUPRAVENTRICULAR TACHYCARDIA: Primary | ICD-10-CM

## 2024-02-14 DIAGNOSIS — I10 ESSENTIAL (PRIMARY) HYPERTENSION: ICD-10-CM

## 2024-02-14 DIAGNOSIS — E78.5 HYPERLIPIDEMIA, UNSPECIFIED HYPERLIPIDEMIA TYPE: ICD-10-CM

## 2024-02-14 PROCEDURE — G8428 CUR MEDS NOT DOCUMENT: HCPCS | Performed by: INTERNAL MEDICINE

## 2024-02-14 PROCEDURE — 1090F PRES/ABSN URINE INCON ASSESS: CPT | Performed by: INTERNAL MEDICINE

## 2024-02-14 PROCEDURE — 3077F SYST BP >= 140 MM HG: CPT | Performed by: INTERNAL MEDICINE

## 2024-02-14 PROCEDURE — 1123F ACP DISCUSS/DSCN MKR DOCD: CPT | Performed by: INTERNAL MEDICINE

## 2024-02-14 PROCEDURE — G8484 FLU IMMUNIZE NO ADMIN: HCPCS | Performed by: INTERNAL MEDICINE

## 2024-02-14 PROCEDURE — 3078F DIAST BP <80 MM HG: CPT | Performed by: INTERNAL MEDICINE

## 2024-02-14 PROCEDURE — 3017F COLORECTAL CA SCREEN DOC REV: CPT | Performed by: INTERNAL MEDICINE

## 2024-02-14 PROCEDURE — G8417 CALC BMI ABV UP PARAM F/U: HCPCS | Performed by: INTERNAL MEDICINE

## 2024-02-14 PROCEDURE — 1036F TOBACCO NON-USER: CPT | Performed by: INTERNAL MEDICINE

## 2024-02-14 PROCEDURE — G8399 PT W/DXA RESULTS DOCUMENT: HCPCS | Performed by: INTERNAL MEDICINE

## 2024-02-14 PROCEDURE — 99214 OFFICE O/P EST MOD 30 MIN: CPT | Performed by: INTERNAL MEDICINE

## 2024-02-14 RX ORDER — BUPROPION HYDROCHLORIDE 75 MG/1
75 TABLET ORAL DAILY
COMMUNITY

## 2024-02-14 RX ORDER — PANTOPRAZOLE SODIUM 40 MG/1
40 TABLET, DELAYED RELEASE ORAL DAILY
COMMUNITY

## 2024-02-14 RX ORDER — AMMONIUM LACTATE 12 G/100G
CREAM TOPICAL PRN
COMMUNITY

## 2024-02-14 RX ORDER — IBUPROFEN 200 MG
600 TABLET ORAL DAILY
COMMUNITY

## 2024-02-14 NOTE — PROGRESS NOTES
1. Have you been to the ER, urgent care clinic since your last visit?  Hospitalized since your last visit?No    2. Have you seen or consulted any other health care providers outside of the [facility] since your last visit?  Include any pap smears or colon screening. Yes Where: PCP Reason for visit: Routine Visit  
current medical management monitor.  Symptoms controlled.  Unable to tolerate CPAP.  Blood pressure is elevated but she will monitor

## 2024-02-19 RX ORDER — METOPROLOL SUCCINATE 100 MG/1
TABLET, EXTENDED RELEASE ORAL
Qty: 90 TABLET | Refills: 1 | Status: SHIPPED | OUTPATIENT
Start: 2024-02-19

## 2024-03-18 RX ORDER — AMLODIPINE BESYLATE 10 MG/1
10 TABLET ORAL DAILY
Qty: 90 TABLET | Refills: 1 | Status: SHIPPED | OUTPATIENT
Start: 2024-03-18

## 2024-07-15 RX ORDER — ATORVASTATIN CALCIUM 40 MG/1
40 TABLET, FILM COATED ORAL DAILY
Qty: 90 TABLET | Refills: 3 | Status: SHIPPED | OUTPATIENT
Start: 2024-07-15

## 2024-08-05 RX ORDER — METOPROLOL SUCCINATE 100 MG/1
TABLET, EXTENDED RELEASE ORAL
Qty: 90 TABLET | Refills: 1 | Status: SHIPPED | OUTPATIENT
Start: 2024-08-05

## 2024-08-14 ENCOUNTER — OFFICE VISIT (OUTPATIENT)
Age: 69
End: 2024-08-14

## 2024-08-14 VITALS
HEIGHT: 64 IN | WEIGHT: 191.2 LBS | BODY MASS INDEX: 32.64 KG/M2 | SYSTOLIC BLOOD PRESSURE: 130 MMHG | DIASTOLIC BLOOD PRESSURE: 76 MMHG | HEART RATE: 56 BPM | OXYGEN SATURATION: 96 %

## 2024-08-14 DIAGNOSIS — I10 ESSENTIAL (PRIMARY) HYPERTENSION: Primary | ICD-10-CM

## 2024-08-14 DIAGNOSIS — R07.2 PRECORDIAL PAIN: ICD-10-CM

## 2024-08-14 ASSESSMENT — ENCOUNTER SYMPTOMS
CHEST TIGHTNESS: 1
COLOR CHANGE: 0
DIARRHEA: 0
APNEA: 0
ABDOMINAL PAIN: 0
BLOOD IN STOOL: 0
SHORTNESS OF BREATH: 1
NAUSEA: 0
CONSTIPATION: 0
WHEEZING: 0
COUGH: 0

## 2024-08-14 NOTE — PROGRESS NOTES
Have you had Fatigue?  Yes    How lon Year   How bad: Moderate-Severe    2.   Have you had Chest Pain? No     3.   Have you had Dyspnea (SOB)? Yes/While bending over    How lon Year    can you walk 2 blocks or a flight of stairs without SOB? Cannot walk 2 blocks, can walk a flight of stairs    4.   Have you had Orthopnea? No      5.   Have you had PND? No     6.   Have you had any leg swelling? Yes/ Left leg and ankle swelling   How lon months How frequent: occasionally How bad: mild    7.    Have you had any weight gain? No    8.    Have you had any palpitations? Yes   How lon-5 years  How frequent: Occasionally     9.    Have you had any syncope? No     10. Do you have any wounds on your legs? No   
3 were single premature ventricular contractions, 1 was a VE. THE VENTRICULAR RUN OCCURRED AT 12:24 AM DAY 1 CONSISTING OF 21 BEATS WITH MAXIMUM HEART RATE  BPM.      7. Supraventricular ectopic activity consisted of 137 beats, of which 14 were in atrial couplets, 119 were single PACs. The longest and fastest supraventricular run occurred at 3:38 AM Day 2 consisting of 6 beats with maximum heart rate of 118 BPM.     IMPRESSION:     1. Sinus rhythm with average heart rate of 75 BPM. Range:  BPM.      2. Premature atrial contractions with one non-sustained PAT of 6 beats with heart rate of 118/min.      3. Premature ventricular contractions with one episode of non-sustained episode of ventricular tachycardia of 21 beats at heart rate of 156 BPM.   ----         Have you had Fatigue?  Yes    How lon Year   How bad: Moderate-Severe     2.   Have you had Chest Pain? No      3.   Have you had Dyspnea (SOB)? Yes/While bending over     How lon Year    can you walk 2 blocks or a flight of stairs without SOB? Cannot walk 2 blocks, can walk a flight of stairs     4.   Have you had Orthopnea? No       5.   Have you had PND? No      6.   Have you had any leg swelling? Yes/ Left leg and ankle swelling            How lon months How frequent: occasionally How bad: mild     7.    Have you had any weight gain? No     8.    Have you had any palpitations? Yes   How lon-5 years  How frequent: Occasionally stops within minutes states that can race and then stops.       9.    Have you had any syncope? No      10. Do you have any wounds on your legs? No           Patient states that has  a weird sensation in the throat states that it happens when is a little more anxious, and patient is due for a stress test to evaluate for obstructive coronary artery disease    Family History   Problem Relation Age of Onset    Hypertension Mother     Pacemaker Father     Heart Surgery Neg Hx     Heart Attack Neg Hx

## 2024-08-14 NOTE — PATIENT INSTRUCTIONS
Learning About the Mediterranean Diet  What is the Mediterranean diet?     The Mediterranean diet is a style of eating rather than a diet plan. It features foods eaten in [place], [place], [place], and other countries along the [place] Sea. It emphasizes eating foods like fish, fruits, vegetables, beans, high-fiber breads and whole grains, nuts, and olive oil. This style of eating includes limited red meat, cheese, and sweets.  Why choose the Mediterranean diet?  A Mediterranean-style diet may improve heart health. It contains more fat than other heart-healthy diets. But the fats are mainly from nuts, unsaturated oils (such as fish oils and olive oil), and certain nut or seed oils (such as canola, soybean, or flaxseed oil). These fats may help protect the heart and blood vessels.  How can you get started on the Mediterranean diet?  Here are some things you can do to switch to a more Mediterranean way of eating.  What to eat  Eat a variety of fruits and vegetables each day, such as grapes, blueberries, tomatoes, broccoli, peppers, figs, olives, spinach, eggplant, beans, lentils, and chickpeas.  Eat a variety of whole-grain foods each day, such as oats, brown rice, and whole wheat bread, pasta, and couscous.  Eat fish at least 2 times a week. Try tuna, salmon, mackerel, lake trout, herring, or sardines.  Eat moderate amounts of low-fat dairy products, such as milk, cheese, or yogurt.  Eat moderate amounts of poultry and eggs.  Choose healthy (unsaturated) fats, such as nuts, olive oil, and certain nut or seed oils like canola, soybean, and flaxseed.  Limit unhealthy (saturated) fats, such as butter, palm oil, and coconut oil. And limit fats found in animal products, such as meat and dairy products made with whole milk. Try to eat red meat only a few times a month in very small amounts.  Limit sweets and desserts to only a few times a week. This includes sugar-sweetened drinks like soda.  The

## 2024-08-27 ENCOUNTER — TELEPHONE (OUTPATIENT)
Age: 69
End: 2024-08-27

## 2024-08-27 NOTE — TELEPHONE ENCOUNTER
You saw patient on 8/14/24 and patient states you recommended aspirin for her arthritis pain. She states the aspirin did not work and she tried taking Aleve and it worked. She wants to know if she can continue to take the Aleve ?

## 2024-09-11 ENCOUNTER — HOSPITAL ENCOUNTER (OUTPATIENT)
Facility: HOSPITAL | Age: 69
Discharge: HOME | End: 2024-09-14
Attending: INTERNAL MEDICINE
Payer: MEDICARE

## 2024-09-11 ENCOUNTER — HOSPITAL ENCOUNTER (OUTPATIENT)
Facility: HOSPITAL | Age: 69
Discharge: HOME | End: 2024-09-13
Attending: INTERNAL MEDICINE
Payer: MEDICARE

## 2024-09-11 VITALS
BODY MASS INDEX: 32.61 KG/M2 | WEIGHT: 191 LBS | DIASTOLIC BLOOD PRESSURE: 76 MMHG | HEIGHT: 64 IN | SYSTOLIC BLOOD PRESSURE: 130 MMHG

## 2024-09-11 DIAGNOSIS — R07.2 PRECORDIAL PAIN: ICD-10-CM

## 2024-09-11 LAB
ECHO AO ASC DIAM: 3.4 CM
ECHO AO ASCENDING AORTA INDEX: 1.77 CM/M2
ECHO AO ROOT DIAM: 2.7 CM
ECHO AO ROOT INDEX: 1.41 CM/M2
ECHO AV AREA PEAK VELOCITY: 1.9 CM2
ECHO AV AREA VTI: 2.5 CM2
ECHO AV AREA/BSA PEAK VELOCITY: 1 CM2/M2
ECHO AV AREA/BSA VTI: 1.3 CM2/M2
ECHO AV MEAN GRADIENT: 4 MMHG
ECHO AV MEAN VELOCITY: 0.9 M/S
ECHO AV PEAK GRADIENT: 8 MMHG
ECHO AV PEAK VELOCITY: 1.4 M/S
ECHO AV VELOCITY RATIO: 0.57
ECHO AV VTI: 29.4 CM
ECHO BSA: 1.98 M2
ECHO EST RA PRESSURE: 3 MMHG
ECHO LA VOL A-L A2C: 92 ML (ref 22–52)
ECHO LA VOL A-L A4C: 86 ML (ref 22–52)
ECHO LA VOL MOD A2C: 87 ML (ref 22–52)
ECHO LA VOL MOD A4C: 80 ML (ref 22–52)
ECHO LA VOLUME AREA LENGTH: 92 ML
ECHO LA VOLUME INDEX A-L A2C: 48 ML/M2 (ref 16–34)
ECHO LA VOLUME INDEX A-L A4C: 45 ML/M2 (ref 16–34)
ECHO LA VOLUME INDEX AREA LENGTH: 48 ML/M2 (ref 16–34)
ECHO LA VOLUME INDEX MOD A2C: 45 ML/M2 (ref 16–34)
ECHO LA VOLUME INDEX MOD A4C: 42 ML/M2 (ref 16–34)
ECHO LV E' LATERAL VELOCITY: 8 CM/S
ECHO LV E' SEPTAL VELOCITY: 6 CM/S
ECHO LV EDV A2C: 100 ML
ECHO LV EDV A4C: 102 ML
ECHO LV EDV BP: 104 ML (ref 56–104)
ECHO LV EDV INDEX A4C: 53 ML/M2
ECHO LV EDV INDEX BP: 54 ML/M2
ECHO LV EDV NDEX A2C: 52 ML/M2
ECHO LV EJECTION FRACTION A2C: 56 %
ECHO LV EJECTION FRACTION A4C: 59 %
ECHO LV EJECTION FRACTION BIPLANE: 58 % (ref 55–100)
ECHO LV ESV A2C: 44 ML
ECHO LV ESV A4C: 42 ML
ECHO LV ESV BP: 44 ML (ref 19–49)
ECHO LV ESV INDEX A2C: 23 ML/M2
ECHO LV ESV INDEX A4C: 22 ML/M2
ECHO LV ESV INDEX BP: 23 ML/M2
ECHO LV FRACTIONAL SHORTENING: 25 % (ref 28–44)
ECHO LV INTERNAL DIMENSION DIASTOLE INDEX: 2.5 CM/M2
ECHO LV INTERNAL DIMENSION DIASTOLIC: 4.8 CM (ref 3.9–5.3)
ECHO LV INTERNAL DIMENSION SYSTOLIC INDEX: 1.88 CM/M2
ECHO LV INTERNAL DIMENSION SYSTOLIC: 3.6 CM
ECHO LV IVSD: 0.8 CM (ref 0.6–0.9)
ECHO LV MASS 2D: 126.7 G (ref 67–162)
ECHO LV MASS INDEX 2D: 66 G/M2 (ref 43–95)
ECHO LV POSTERIOR WALL DIASTOLIC: 0.8 CM (ref 0.6–0.9)
ECHO LV RELATIVE WALL THICKNESS RATIO: 0.33
ECHO LVOT AREA: 3.1 CM2
ECHO LVOT AV VTI INDEX: 0.78
ECHO LVOT DIAM: 2 CM
ECHO LVOT MEAN GRADIENT: 2 MMHG
ECHO LVOT PEAK GRADIENT: 3 MMHG
ECHO LVOT PEAK VELOCITY: 0.8 M/S
ECHO LVOT STROKE VOLUME INDEX: 37.5 ML/M2
ECHO LVOT SV: 71.9 ML
ECHO LVOT VTI: 22.9 CM
ECHO MV A VELOCITY: 0.93 M/S
ECHO MV E DECELERATION TIME (DT): 286.3 MS
ECHO MV E VELOCITY: 0.85 M/S
ECHO MV E/A RATIO: 0.91
ECHO MV E/E' LATERAL: 10.63
ECHO MV E/E' RATIO (AVERAGED): 12.4
ECHO MV E/E' SEPTAL: 14.17
ECHO PV MAX VELOCITY: 0.7 M/S
ECHO PV PEAK GRADIENT: 2 MMHG
ECHO RA AREA 4C: 18.8 CM2
ECHO RA END SYSTOLIC VOLUME APICAL 4 CHAMBER INDEX BSA: 27 ML/M2
ECHO RA VOLUME AREA LENGTH APICAL 4 CHAMBER: 55 ML
ECHO RA VOLUME: 52 ML
ECHO RIGHT VENTRICULAR SYSTOLIC PRESSURE (RVSP): 19 MMHG
ECHO RV BASAL DIMENSION: 4.2 CM
ECHO RV FREE WALL PEAK S': 13 CM/S
ECHO RV LONGITUDINAL DIMENSION: 5.3 CM
ECHO RV MID DIMENSION: 3.2 CM
ECHO RV TAPSE: 2.2 CM (ref 1.7–?)
ECHO RVOT PEAK GRADIENT: 2 MMHG
ECHO RVOT PEAK VELOCITY: 0.6 M/S
ECHO TV REGURGITANT MAX VELOCITY: 1.98 M/S
ECHO TV REGURGITANT PEAK GRADIENT: 16 MMHG

## 2024-09-11 PROCEDURE — 93306 TTE W/DOPPLER COMPLETE: CPT

## 2024-09-11 PROCEDURE — 71046 X-RAY EXAM CHEST 2 VIEWS: CPT

## 2024-09-25 ENCOUNTER — OFFICE VISIT (OUTPATIENT)
Age: 69
End: 2024-09-25
Payer: MEDICARE

## 2024-09-25 VITALS
OXYGEN SATURATION: 97 % | DIASTOLIC BLOOD PRESSURE: 81 MMHG | BODY MASS INDEX: 32.44 KG/M2 | WEIGHT: 190 LBS | HEART RATE: 57 BPM | SYSTOLIC BLOOD PRESSURE: 153 MMHG | HEIGHT: 64 IN

## 2024-09-25 DIAGNOSIS — Z87.898 HISTORY OF CHEST PAIN: Primary | ICD-10-CM

## 2024-09-25 DIAGNOSIS — R06.09 DYSPNEA ON EXERTION: ICD-10-CM

## 2024-09-25 DIAGNOSIS — E78.49 OTHER HYPERLIPIDEMIA: ICD-10-CM

## 2024-09-25 DIAGNOSIS — I10 HYPERTENSION, UNSPECIFIED TYPE: ICD-10-CM

## 2024-09-25 PROCEDURE — 99214 OFFICE O/P EST MOD 30 MIN: CPT | Performed by: INTERNAL MEDICINE

## 2024-09-25 PROCEDURE — 1123F ACP DISCUSS/DSCN MKR DOCD: CPT | Performed by: INTERNAL MEDICINE

## 2024-09-25 PROCEDURE — G8399 PT W/DXA RESULTS DOCUMENT: HCPCS | Performed by: INTERNAL MEDICINE

## 2024-09-25 PROCEDURE — G8417 CALC BMI ABV UP PARAM F/U: HCPCS | Performed by: INTERNAL MEDICINE

## 2024-09-25 PROCEDURE — 3079F DIAST BP 80-89 MM HG: CPT | Performed by: INTERNAL MEDICINE

## 2024-09-25 PROCEDURE — 1036F TOBACCO NON-USER: CPT | Performed by: INTERNAL MEDICINE

## 2024-09-25 PROCEDURE — 3017F COLORECTAL CA SCREEN DOC REV: CPT | Performed by: INTERNAL MEDICINE

## 2024-09-25 PROCEDURE — 1090F PRES/ABSN URINE INCON ASSESS: CPT | Performed by: INTERNAL MEDICINE

## 2024-09-25 PROCEDURE — G8428 CUR MEDS NOT DOCUMENT: HCPCS | Performed by: INTERNAL MEDICINE

## 2024-09-25 PROCEDURE — 3077F SYST BP >= 140 MM HG: CPT | Performed by: INTERNAL MEDICINE

## 2024-09-25 ASSESSMENT — ENCOUNTER SYMPTOMS
SHORTNESS OF BREATH: 0
CHEST TIGHTNESS: 0

## 2024-10-09 ENCOUNTER — HOSPITAL ENCOUNTER (OUTPATIENT)
Facility: HOSPITAL | Age: 69
Discharge: HOME | End: 2024-10-12
Payer: MEDICARE

## 2024-10-09 VITALS — BODY MASS INDEX: 32.96 KG/M2 | WEIGHT: 192 LBS

## 2024-10-09 DIAGNOSIS — R06.09 DYSPNEA ON EXERTION: ICD-10-CM

## 2024-10-09 PROCEDURE — 94726 PLETHYSMOGRAPHY LUNG VOLUMES: CPT

## 2024-10-09 PROCEDURE — 94729 DIFFUSING CAPACITY: CPT

## 2024-10-09 PROCEDURE — 94060 EVALUATION OF WHEEZING: CPT

## 2024-10-16 DIAGNOSIS — R06.09 DYSPNEA ON EXERTION: Primary | ICD-10-CM

## 2024-11-12 ENCOUNTER — CLINICAL DOCUMENTATION (OUTPATIENT)
Age: 69
End: 2024-11-12

## 2024-12-20 RX ORDER — METOPROLOL SUCCINATE 100 MG/1
TABLET, EXTENDED RELEASE ORAL
Qty: 90 TABLET | Refills: 1 | Status: SHIPPED | OUTPATIENT
Start: 2024-12-20

## 2024-12-20 RX ORDER — AMLODIPINE BESYLATE 10 MG/1
10 TABLET ORAL DAILY
Qty: 90 TABLET | Refills: 1 | Status: SHIPPED | OUTPATIENT
Start: 2024-12-20

## 2025-03-22 NOTE — PROGRESS NOTES
HISTORY OF PRESENT ILLNESS  Veronica Bates is a 69 y.o. female.    PMH HTN, HLD  ----  CARDIAC STUDIES  ----    Nuclear stress 8/2024  Low risk study    * Electrocardiogram (ECG) response is negative.    * Transient ischemic dilatation absent.    * Stress Single Photon Emission Computed Tomography (SPECT) tomographic images reveal homogeneous tracer uptake throughout the myocardium. Rest images show no significant change.    * Left ventricle size appears normal on both stress and rest.    * Post stress left ventricular ejection fraction is normal, 69 %.    * Post stress images segmental wall thickening and motion are normal.    * Myocardial perfusion imaging is normal.    * This is a low risk scan.  ----  9/11/2024 2d echo    Image quality is adequate. Technically difficult study due to low parasternal window.    Left Ventricle: Normal left ventricular systolic function with a visually estimated EF of 55 - 60%. EF by 2D Simpsons Biplane is 58%. Left ventricle size is normal. Normal wall thickness. Normal wall motion.    Left Atrium: Left atrium is moderately dilated. Left atrial volume index is moderately increased (42-48 mL/m2). LA Vol Index A/L is 48 mL/m2.    Right Ventricle: Right ventricle is mildly dilated. Normal systolic function. TAPSE is normal. TAPSE is 2.2 cm. RV Peak S' is 13 cm/s. TDI systolic excursion is normal.    Tricuspid Valve: Mild regurgitation. Normal RVSP. The estimated RVSP is 19 mmHg.  ----  2d echo 9/23/2015  SUMMARY:   Left ventricle: Systolic function was normal by visual assessment.   Ejection fraction was estimated to be 60 %. There were no regional wall   motion abnormalities.     Atrial septum: There was no evidence of intracardiac shunt by   peripherally-administered agitated saline contrast.     Right atrium: The atrium was mildly dilated.     Mitral valve: There was trivial regurgitation.     Tricuspid valve: There was mild regurgitation. Tricuspid regurgitation   peak velocity:

## 2025-03-26 ENCOUNTER — OFFICE VISIT (OUTPATIENT)
Age: 70
End: 2025-03-26
Payer: MEDICARE

## 2025-03-26 VITALS
SYSTOLIC BLOOD PRESSURE: 134 MMHG | WEIGHT: 192 LBS | HEIGHT: 64 IN | HEART RATE: 52 BPM | BODY MASS INDEX: 32.78 KG/M2 | OXYGEN SATURATION: 97 % | DIASTOLIC BLOOD PRESSURE: 76 MMHG

## 2025-03-26 DIAGNOSIS — I50.31 ACUTE HEART FAILURE WITH NORMAL EJECTION FRACTION (HCC): ICD-10-CM

## 2025-03-26 DIAGNOSIS — I47.10 SUPRAVENTRICULAR TACHYCARDIA: Primary | ICD-10-CM

## 2025-03-26 DIAGNOSIS — I10 HYPERTENSION, UNSPECIFIED TYPE: ICD-10-CM

## 2025-03-26 DIAGNOSIS — R06.09 DYSPNEA ON EXERTION: ICD-10-CM

## 2025-03-26 DIAGNOSIS — R00.2 PALPITATIONS: ICD-10-CM

## 2025-03-26 DIAGNOSIS — E78.49 OTHER HYPERLIPIDEMIA: ICD-10-CM

## 2025-03-26 DIAGNOSIS — Z87.898 HISTORY OF CHEST PAIN: ICD-10-CM

## 2025-03-26 PROCEDURE — 1036F TOBACCO NON-USER: CPT | Performed by: INTERNAL MEDICINE

## 2025-03-26 PROCEDURE — 3075F SYST BP GE 130 - 139MM HG: CPT | Performed by: INTERNAL MEDICINE

## 2025-03-26 PROCEDURE — 1123F ACP DISCUSS/DSCN MKR DOCD: CPT | Performed by: INTERNAL MEDICINE

## 2025-03-26 PROCEDURE — G8428 CUR MEDS NOT DOCUMENT: HCPCS | Performed by: INTERNAL MEDICINE

## 2025-03-26 PROCEDURE — G8399 PT W/DXA RESULTS DOCUMENT: HCPCS | Performed by: INTERNAL MEDICINE

## 2025-03-26 PROCEDURE — G8417 CALC BMI ABV UP PARAM F/U: HCPCS | Performed by: INTERNAL MEDICINE

## 2025-03-26 PROCEDURE — 99214 OFFICE O/P EST MOD 30 MIN: CPT | Performed by: INTERNAL MEDICINE

## 2025-03-26 PROCEDURE — 1090F PRES/ABSN URINE INCON ASSESS: CPT | Performed by: INTERNAL MEDICINE

## 2025-03-26 PROCEDURE — 3017F COLORECTAL CA SCREEN DOC REV: CPT | Performed by: INTERNAL MEDICINE

## 2025-03-26 PROCEDURE — 93000 ELECTROCARDIOGRAM COMPLETE: CPT | Performed by: INTERNAL MEDICINE

## 2025-03-26 PROCEDURE — 3078F DIAST BP <80 MM HG: CPT | Performed by: INTERNAL MEDICINE

## 2025-03-26 RX ORDER — FUROSEMIDE 20 MG/1
20 TABLET ORAL DAILY
Qty: 90 TABLET | Refills: 1 | Status: SHIPPED | OUTPATIENT
Start: 2025-03-26

## 2025-03-26 RX ORDER — METOPROLOL SUCCINATE 50 MG/1
50 TABLET, EXTENDED RELEASE ORAL DAILY
Qty: 90 TABLET | Refills: 1 | Status: SHIPPED | OUTPATIENT
Start: 2025-03-26

## 2025-03-26 ASSESSMENT — ENCOUNTER SYMPTOMS: SHORTNESS OF BREATH: 1

## 2025-03-26 NOTE — PATIENT INSTRUCTIONS
Mediterranean diet may also include red wine with your meal--1 glass each day for women and up to 2 glasses a day for men.  Tips for eating at home  Use herbs, spices, garlic, lemon zest, and citrus juice instead of salt to add flavor to foods.  Add avocado slices to your sandwich instead of walton.  Have fish for lunch or dinner instead of red meat. Brush the fish with olive oil, and broil or grill it.  Sprinkle your salad with seeds or nuts instead of cheese.  Cook with olive or canola oil instead of butter or oils that are high in saturated fat.  Switch from 2% milk or whole milk to 1% or fat-free milk.  Dip raw vegetables in a vinaigrette dressing or hummus instead of dips made from mayonnaise or sour cream.  Have a piece of fruit for dessert instead of a piece of cake. Try baked apples, or have some dried fruit.  Tips for eating out  Try broiled, grilled, baked, or poached fish instead of having it fried or breaded.  Ask your  to have your meals prepared with olive oil instead of butter.  Order dishes made with marinara sauce or sauces made from olive oil. Avoid sauces made from cream or mayonnaise.  Choose whole-grain breads, whole wheat pasta and pizza crust, brown rice, beans, and lentils.  Cut back on butter or margarine on bread. Instead, you can dip your bread in a small amount of olive oil.  Ask for a side salad or grilled vegetables instead of french fries or chips.  Where can you learn more?  Go to https://www.[employer].net/patientEd and enter O407 to learn more about \"Learning About the Mediterranean Diet.\"  Current as of: May 9, 2022               Content Version: 13.5  © 6648-2426 [employer].   Care instructions adapted under license by [employer]. If you have questions about a medical condition or this instruction, always ask your healthcare professional. [employer] disclaims any warranty or liability for your use of this information.

## 2025-03-26 NOTE — PROGRESS NOTES
Have you had Fatigue?  No     2.   Have you had have you had Chest Pain? No     3.   Have you had Dyspnea (SOB) ? No   4.   Have you had Orthopnea? No     5.   Have you had PND? No   6.   Have you had leg swelling? No   7.    Have you had any weight gain? No     8. Have you had any palpitations? No    9. Have you had any syncope? No   10. Do you have any wounds on legs?no

## 2025-04-07 ENCOUNTER — HOSPITAL ENCOUNTER (OUTPATIENT)
Facility: HOSPITAL | Age: 70
Discharge: HOME | End: 2025-04-10
Payer: MEDICARE

## 2025-04-07 DIAGNOSIS — I50.31 ACUTE HEART FAILURE WITH NORMAL EJECTION FRACTION (HCC): ICD-10-CM

## 2025-04-07 LAB
ANION GAP SERPL CALC-SCNC: 7 MMOL/L (ref 3–18)
BUN SERPL-MCNC: 16 MG/DL (ref 7–18)
BUN/CREAT SERPL: 16 (ref 12–20)
CALCIUM SERPL-MCNC: 9.1 MG/DL (ref 8.5–10.1)
CHLORIDE SERPL-SCNC: 103 MMOL/L (ref 100–111)
CO2 SERPL-SCNC: 28 MMOL/L (ref 21–32)
CREAT SERPL-MCNC: 1.03 MG/DL (ref 0.6–1.3)
GLUCOSE SERPL-MCNC: 147 MG/DL (ref 74–99)
POTASSIUM SERPL-SCNC: 4.1 MMOL/L (ref 3.5–5.5)
SODIUM SERPL-SCNC: 138 MMOL/L (ref 136–145)

## 2025-04-07 PROCEDURE — 80048 BASIC METABOLIC PNL TOTAL CA: CPT

## 2025-04-07 PROCEDURE — 36415 COLL VENOUS BLD VENIPUNCTURE: CPT

## 2025-06-13 RX ORDER — AMLODIPINE BESYLATE 10 MG/1
10 TABLET ORAL DAILY
Qty: 90 TABLET | Refills: 3 | Status: SHIPPED | OUTPATIENT
Start: 2025-06-13

## 2025-06-23 RX ORDER — ATORVASTATIN CALCIUM 40 MG/1
40 TABLET, FILM COATED ORAL DAILY
Qty: 90 TABLET | Refills: 1 | Status: SHIPPED | OUTPATIENT
Start: 2025-06-23